# Patient Record
Sex: FEMALE | Race: WHITE | NOT HISPANIC OR LATINO | Employment: UNEMPLOYED | URBAN - METROPOLITAN AREA
[De-identification: names, ages, dates, MRNs, and addresses within clinical notes are randomized per-mention and may not be internally consistent; named-entity substitution may affect disease eponyms.]

---

## 2018-02-22 ENCOUNTER — OFFICE VISIT (OUTPATIENT)
Dept: URGENT CARE | Facility: PHYSICIAN GROUP | Age: 23
End: 2018-02-22
Payer: COMMERCIAL

## 2018-02-22 VITALS
BODY MASS INDEX: 23.39 KG/M2 | TEMPERATURE: 99.5 F | DIASTOLIC BLOOD PRESSURE: 76 MMHG | OXYGEN SATURATION: 99 % | WEIGHT: 132 LBS | HEART RATE: 76 BPM | HEIGHT: 63 IN | SYSTOLIC BLOOD PRESSURE: 122 MMHG | RESPIRATION RATE: 16 BRPM

## 2018-02-22 DIAGNOSIS — J45.31 MILD PERSISTENT ASTHMA WITH ACUTE EXACERBATION: ICD-10-CM

## 2018-02-22 PROCEDURE — 99203 OFFICE O/P NEW LOW 30 MIN: CPT | Mod: 25 | Performed by: PHYSICIAN ASSISTANT

## 2018-02-22 PROCEDURE — 94640 AIRWAY INHALATION TREATMENT: CPT | Performed by: PHYSICIAN ASSISTANT

## 2018-02-22 RX ORDER — MONTELUKAST SODIUM 10 MG/1
10 TABLET ORAL DAILY
Qty: 30 TAB | Refills: 3 | Status: SHIPPED | OUTPATIENT
Start: 2018-02-22 | End: 2021-06-09

## 2018-02-22 RX ORDER — ALBUTEROL SULFATE 2.5 MG/3ML
2.5 SOLUTION RESPIRATORY (INHALATION) ONCE
Status: COMPLETED | OUTPATIENT
Start: 2018-02-22 | End: 2018-02-22

## 2018-02-22 RX ORDER — PREDNISONE 20 MG/1
TABLET ORAL
Qty: 10 TAB | Refills: 0 | Status: ON HOLD | OUTPATIENT
Start: 2018-02-22 | End: 2019-06-03

## 2018-02-22 RX ORDER — ALBUTEROL SULFATE 2.5 MG/3ML
2.5 SOLUTION RESPIRATORY (INHALATION) EVERY 4 HOURS PRN
Qty: 30 BULLET | Refills: 0 | Status: SHIPPED | OUTPATIENT
Start: 2018-02-22 | End: 2021-06-09

## 2018-02-22 RX ADMIN — ALBUTEROL SULFATE 2.5 MG: 2.5 SOLUTION RESPIRATORY (INHALATION) at 09:35

## 2018-02-22 ASSESSMENT — ENCOUNTER SYMPTOMS
WHEEZING: 1
SINUS PAIN: 0
GASTROINTESTINAL NEGATIVE: 1
MUSCULOSKELETAL NEGATIVE: 1
CONSTITUTIONAL NEGATIVE: 1
CARDIOVASCULAR NEGATIVE: 1
COUGH: 1
SORE THROAT: 0
SHORTNESS OF BREATH: 1
CHEST TIGHTNESS: 1
DIFFICULTY BREATHING: 1
NEUROLOGICAL NEGATIVE: 1
SPUTUM PRODUCTION: 0

## 2018-02-22 NOTE — LETTER
February 22, 2018         Patient: Lissette Tong   YOB: 1995   Date of Visit: 2/22/2018           To Whom it May Concern:    Lissette Tong was seen in my clinic on 2/22/2018. She may return to work on Friday Feb. 23rd.    If you have any questions or concerns, please don't hesitate to call.        Sincerely,           Kristopher Munoz P.A.-C.  Electronically Signed

## 2018-02-22 NOTE — PROGRESS NOTES
Subjective:      Lissette Tong is a 23 y.o. female who presents with Asthma (pt states short of breath, and coughing)            Asthma   She complains of chest tightness, cough, difficulty breathing, shortness of breath and wheezing. There is no sputum production. This is a new problem. The current episode started yesterday. The problem occurs constantly. The problem has been gradually worsening. The cough is non-productive. Pertinent negatives include no ear pain or sore throat. Her symptoms are alleviated by leukotriene antagonist and beta-agonist. She reports minimal improvement on treatment. Her past medical history is significant for asthma. There is no history of bronchitis or pneumonia.   Asthma exacerbation ×4 days. Patient out of her Singulair. Her nebulizer solution is also .      PMH:  has a past medical history of Asthma.  MEDS:   Current Outpatient Prescriptions:   •  albuterol (VENTOLIN OR PROVENTIL) 108 (90 BASE) MCG/ACT AERS, Inhale 2 Puffs by mouth every four hours as needed., Disp: 1 Inhaler, Rfl: 3  •  NON SPECIFIED, BCP , Disp: , Rfl:   •  azithromycin (ZITHROMAX) 250 MG TABS, Take  by mouth every day. 2 tabs by mouth day 1, 1 tab by mouth days 2-5, Disp: 6 Each, Rfl: 0  •  albuterol (PROVENTIL) 2.5mg/3ml NEBU, 3 mL by Nebulization route every four hours as needed for Shortness of Breath., Disp: 2.5 mL, Rfl: 20  •  ALBUTEROL, by Does not apply route., Disp: , Rfl:   •  ondansetron (ZOFRAN) 4 MG TABS, Take 1 Tab by mouth every 6 hours as needed for Nausea/Vomiting., Disp: 10 Each, Rfl: 1  ALLERGIES:   Allergies   Allergen Reactions   • Tylenol Vomiting     SURGHX: No past surgical history on file.  SOCHX:  reports that she has never smoked. She does not have any smokeless tobacco history on file. She reports that she does not drink alcohol or use drugs.  FH: family history is not on file.      Review of Systems   Constitutional: Negative.    HENT: Negative for congestion, ear pain, sinus  "pain and sore throat.    Respiratory: Positive for cough, shortness of breath and wheezing. Negative for sputum production.    Cardiovascular: Negative.    Gastrointestinal: Negative.    Musculoskeletal: Negative.    Neurological: Negative.        Medications, Allergies, and current problem list reviewed today in Epic     Objective:     /76   Pulse 76   Temp 37.5 °C (99.5 °F)   Resp 16   Ht 1.6 m (5' 3\")   Wt 59.9 kg (132 lb)   SpO2 99%   BMI 23.38 kg/m²      Physical Exam   Constitutional: She is oriented to person, place, and time. She appears well-developed and well-nourished. No distress.   HENT:   Head: Normocephalic and atraumatic.   Right Ear: Tympanic membrane and external ear normal.   Left Ear: Tympanic membrane and external ear normal.   Nose: Nose normal.   Mouth/Throat: Oropharynx is clear and moist. No oropharyngeal exudate.   Eyes: Conjunctivae and EOM are normal. Pupils are equal, round, and reactive to light. Right eye exhibits no discharge. Left eye exhibits no discharge.   Neck: Normal range of motion. Neck supple.   Cardiovascular: Normal rate, regular rhythm and normal heart sounds.    Pulmonary/Chest: Effort normal. No tachypnea. No respiratory distress. She has decreased breath sounds. She has wheezes. She has no rhonchi. She has no rales.   Musculoskeletal: Normal range of motion.   Lymphadenopathy:     She has no cervical adenopathy.   Neurological: She is alert and oriented to person, place, and time.   Skin: Skin is warm and dry. She is not diaphoretic.   Psychiatric: She has a normal mood and affect. Her behavior is normal. Judgment and thought content normal.   Nursing note and vitals reviewed.              Assessment/Plan:     1. Mild persistent asthma with acute exacerbation  albuterol (PROVENTIL) 2.5mg/3ml nebulizer solution 2.5 mg    predniSONE (DELTASONE) 20 MG Tab    montelukast (SINGULAIR) 10 MG Tab    albuterol (PROVENTIL) 2.5mg/3ml Nebu Soln solution for " nebulization     PO2 adequate, lungs show decreased breath sounds with wheezing. No signs of infectious etiology.  She was given a albuterol nebulized treatment and clinic, she reports significant improvement  Prednisone  Refill of Singulair and albuterol solution  OTC meds and conservative measures as discussed  Return to clinic or go to ED if symptoms worsen or persist. Indications for ED discussed at length. Patient voices understanding. Follow-up with your primary care provider in 3-5 days. Red flags discussed.    Please note that this dictation was created using voice recognition software. I have made every reasonable attempt to correct obvious errors, but I expect that there are errors of grammar and possibly content that I did not discover before finalizing the note.

## 2018-02-27 ENCOUNTER — HOSPITAL ENCOUNTER (EMERGENCY)
Facility: MEDICAL CENTER | Age: 23
End: 2018-02-28
Attending: EMERGENCY MEDICINE
Payer: COMMERCIAL

## 2018-02-27 DIAGNOSIS — R07.89 CHEST WALL PAIN: ICD-10-CM

## 2018-02-27 LAB
ALBUMIN SERPL BCP-MCNC: 4.5 G/DL (ref 3.2–4.9)
ALBUMIN/GLOB SERPL: 1.5 G/DL
ALP SERPL-CCNC: 40 U/L (ref 30–99)
ALT SERPL-CCNC: 16 U/L (ref 2–50)
ANION GAP SERPL CALC-SCNC: 9 MMOL/L (ref 0–11.9)
APTT PPP: 26.3 SEC (ref 24.7–36)
AST SERPL-CCNC: 13 U/L (ref 12–45)
BASOPHILS # BLD AUTO: 0.1 % (ref 0–1.8)
BASOPHILS # BLD: 0.01 K/UL (ref 0–0.12)
BILIRUB SERPL-MCNC: 0.2 MG/DL (ref 0.1–1.5)
BNP SERPL-MCNC: 6 PG/ML (ref 0–100)
BUN SERPL-MCNC: 10 MG/DL (ref 8–22)
CALCIUM SERPL-MCNC: 9.2 MG/DL (ref 8.5–10.5)
CHLORIDE SERPL-SCNC: 104 MMOL/L (ref 96–112)
CO2 SERPL-SCNC: 25 MMOL/L (ref 20–33)
CREAT SERPL-MCNC: 0.65 MG/DL (ref 0.5–1.4)
EKG IMPRESSION: NORMAL
EOSINOPHIL # BLD AUTO: 0 K/UL (ref 0–0.51)
EOSINOPHIL NFR BLD: 0 % (ref 0–6.9)
ERYTHROCYTE [DISTWIDTH] IN BLOOD BY AUTOMATED COUNT: 41.8 FL (ref 35.9–50)
GLOBULIN SER CALC-MCNC: 3 G/DL (ref 1.9–3.5)
GLUCOSE SERPL-MCNC: 108 MG/DL (ref 65–99)
HCG UR QL: NEGATIVE
HCT VFR BLD AUTO: 42.8 % (ref 37–47)
HGB BLD-MCNC: 14.3 G/DL (ref 12–16)
IMM GRANULOCYTES # BLD AUTO: 0.04 K/UL (ref 0–0.11)
IMM GRANULOCYTES NFR BLD AUTO: 0.5 % (ref 0–0.9)
INR PPP: 0.98 (ref 0.87–1.13)
LIPASE SERPL-CCNC: 35 U/L (ref 11–82)
LYMPHOCYTES # BLD AUTO: 0.84 K/UL (ref 1–4.8)
LYMPHOCYTES NFR BLD: 9.6 % (ref 22–41)
MCH RBC QN AUTO: 30.4 PG (ref 27–33)
MCHC RBC AUTO-ENTMCNC: 33.4 G/DL (ref 33.6–35)
MCV RBC AUTO: 90.9 FL (ref 81.4–97.8)
MONOCYTES # BLD AUTO: 0.32 K/UL (ref 0–0.85)
MONOCYTES NFR BLD AUTO: 3.7 % (ref 0–13.4)
NEUTROPHILS # BLD AUTO: 7.55 K/UL (ref 2–7.15)
NEUTROPHILS NFR BLD: 86.1 % (ref 44–72)
NRBC # BLD AUTO: 0 K/UL
NRBC BLD-RTO: 0 /100 WBC
PLATELET # BLD AUTO: 318 K/UL (ref 164–446)
PMV BLD AUTO: 9.8 FL (ref 9–12.9)
POTASSIUM SERPL-SCNC: 4 MMOL/L (ref 3.6–5.5)
PROT SERPL-MCNC: 7.5 G/DL (ref 6–8.2)
PROTHROMBIN TIME: 12.7 SEC (ref 12–14.6)
RBC # BLD AUTO: 4.71 M/UL (ref 4.2–5.4)
SODIUM SERPL-SCNC: 138 MMOL/L (ref 135–145)
SP GR UR REFRACTOMETRY: 1.02
TROPONIN I SERPL-MCNC: <0.01 NG/ML (ref 0–0.04)
WBC # BLD AUTO: 8.8 K/UL (ref 4.8–10.8)

## 2018-02-27 PROCEDURE — 80053 COMPREHEN METABOLIC PANEL: CPT

## 2018-02-27 PROCEDURE — 85379 FIBRIN DEGRADATION QUANT: CPT

## 2018-02-27 PROCEDURE — 93005 ELECTROCARDIOGRAM TRACING: CPT

## 2018-02-27 PROCEDURE — 81025 URINE PREGNANCY TEST: CPT

## 2018-02-27 PROCEDURE — 99284 EMERGENCY DEPT VISIT MOD MDM: CPT

## 2018-02-27 PROCEDURE — 83880 ASSAY OF NATRIURETIC PEPTIDE: CPT

## 2018-02-27 PROCEDURE — 93005 ELECTROCARDIOGRAM TRACING: CPT | Performed by: EMERGENCY MEDICINE

## 2018-02-27 PROCEDURE — 85730 THROMBOPLASTIN TIME PARTIAL: CPT

## 2018-02-27 PROCEDURE — 85610 PROTHROMBIN TIME: CPT

## 2018-02-27 PROCEDURE — 85025 COMPLETE CBC W/AUTO DIFF WBC: CPT

## 2018-02-27 PROCEDURE — 83690 ASSAY OF LIPASE: CPT

## 2018-02-27 PROCEDURE — 84484 ASSAY OF TROPONIN QUANT: CPT

## 2018-02-28 ENCOUNTER — HOSPITAL ENCOUNTER (OUTPATIENT)
Dept: RADIOLOGY | Facility: MEDICAL CENTER | Age: 23
End: 2018-02-28
Attending: EMERGENCY MEDICINE

## 2018-02-28 VITALS
DIASTOLIC BLOOD PRESSURE: 91 MMHG | TEMPERATURE: 98 F | HEART RATE: 71 BPM | HEIGHT: 63 IN | RESPIRATION RATE: 17 BRPM | OXYGEN SATURATION: 99 % | SYSTOLIC BLOOD PRESSURE: 137 MMHG

## 2018-02-28 LAB — DEPRECATED D DIMER PPP IA-ACNC: <200 NG/ML(D-DU)

## 2018-02-28 PROCEDURE — 700111 HCHG RX REV CODE 636 W/ 250 OVERRIDE (IP): Performed by: EMERGENCY MEDICINE

## 2018-02-28 PROCEDURE — 96372 THER/PROPH/DIAG INJ SC/IM: CPT

## 2018-02-28 PROCEDURE — 71045 X-RAY EXAM CHEST 1 VIEW: CPT

## 2018-02-28 RX ORDER — IBUPROFEN 600 MG/1
600 TABLET ORAL EVERY 6 HOURS PRN
Qty: 30 TAB | Refills: 0 | Status: ON HOLD | OUTPATIENT
Start: 2018-02-28 | End: 2019-06-03

## 2018-02-28 RX ORDER — KETOROLAC TROMETHAMINE 30 MG/ML
15 INJECTION, SOLUTION INTRAMUSCULAR; INTRAVENOUS ONCE
Status: COMPLETED | OUTPATIENT
Start: 2018-02-28 | End: 2018-02-28

## 2018-02-28 RX ADMIN — KETOROLAC TROMETHAMINE 15 MG: 30 INJECTION, SOLUTION INTRAMUSCULAR at 00:30

## 2018-02-28 NOTE — ED NOTES
"Pt ambulatory to triage, labs drawn and sent, NAD, pt educated to notify staff of any changes. Pt reports \"I took a pregnancy test and it came back negative but I think there's a chance I could be pregnant.\" Pt given urine specimen cup.   "

## 2018-02-28 NOTE — ED NOTES
"Pt rounded on, apologized for wait times, VS obtained, pt reports her chest pain is \"not as bad as before\" but she now has a headache. Pt informed to notify staff of any changes.   "

## 2018-02-28 NOTE — ED PROVIDER NOTES
"ED Provider Note    Scribed for Danny Ruff M.D. by Severiano Mejia. 2/28/2018, 12:08 AM.    Primary care provider: JUVENTINO Perera  Means of arrival: Walk In  History obtained from: Patient  History limited by: None     CHIEF COMPLAINT  Chief Complaint   Patient presents with   • Chest Pain     HPI  Lissette Tong is a 23 y.o. Female with a history of asthma who presents to the Emergency Department with chest pain. The patient reports an onset of chest pain six days ago that has been intermittent. Her chest pain is located in her mid sternum which she describes as a \"sharp\" pain. She rates her current chest pain as 3/10 in severity. Her chest pain is slightly exacerbated by laying down and is improved when sitting up. She reports some increased shortness of breath during the day today which has resolved after using her rescue inhaler six times today. She complains of a headache and sore throat associated with her shortness of breath.   No familial cardiac history. No recent long distance travel.  The patient has a history of asthma, which she treats with Albuterol in morning and Singulair at night.   She denies any fever, nausea, vomiting, leg swelling, diarrhea, cough.     REVIEW OF SYSTEMS  Pertinent positives include chest pain, headache, sore throat. Pertinent negatives include fever, nausea, vomiting, leg swelling, diarrhea, cough. All other systems negative.    PAST MEDICAL HISTORY   has a past medical history of ASTHMA.    SURGICAL HISTORY  patient denies any surgical history    SOCIAL HISTORY  Social History   Substance Use Topics   • Smoking status: Never Smoker   • Smokeless tobacco: Never Used   • Alcohol use No      History   Drug Use No     FAMILY HISTORY  None noted.     CURRENT MEDICATIONS  Home Medications     Reviewed by Yue Szymanski R.N. (Registered Nurse) on 02/27/18 at 2349  Med List Status: Not Addressed   Medication Last Dose Status   albuterol (PROVENTIL) 2.5mg/3ml Nebu " "Soln solution for nebulization  Active   montelukast (SINGULAIR) 10 MG Tab  Active   NON SPECIFIED Taking, missed two doses Active   predniSONE (DELTASONE) 20 MG Tab  Active              ALLERGIES  Allergies   Allergen Reactions   • Tylenol Vomiting     PHYSICAL EXAM  VITAL SIGNS: /91   Pulse 86   Temp 36.7 °C (98 °F)   Resp 16   Ht 1.6 m (5' 3\")   LMP 01/28/2018 (Within Days)   SpO2 100%     Constitutional: Well developed, Well nourished, no distress.   HENT: Normocephalic, Atraumatic  Eyes: Conjunctiva normal  Cardiovascular: Normal heart rate, Normal rhythm, No murmurs, equal pulses.   Pulmonary: Normal breath sounds, No respiratory distress, No wheezing, No rales, No rhonchi.  Chest: No reproducible chest wall tenderness or deformity.   Abdomen:Soft, No tenderness, No masses, no rebound, no guarding.   Musculoskeletal: No major deformities noted, No tenderness. No calf tenderness. No lower extremity edema.   Skin: Warm, Dry, No erythema, No rash.   Neurologic: Alert & oriented x 3, Normal motor function,  No focal deficits noted.    LABS  Results for orders placed or performed during the hospital encounter of 02/27/18   Troponin   Result Value Ref Range    Troponin I <0.01 0.00 - 0.04 ng/mL   Btype Natriuretic Peptide   Result Value Ref Range    B Natriuretic Peptide 6 0 - 100 pg/mL   CBC with Differential   Result Value Ref Range    WBC 8.8 4.8 - 10.8 K/uL    RBC 4.71 4.20 - 5.40 M/uL    Hemoglobin 14.3 12.0 - 16.0 g/dL    Hematocrit 42.8 37.0 - 47.0 %    MCV 90.9 81.4 - 97.8 fL    MCH 30.4 27.0 - 33.0 pg    MCHC 33.4 (L) 33.6 - 35.0 g/dL    RDW 41.8 35.9 - 50.0 fL    Platelet Count 318 164 - 446 K/uL    MPV 9.8 9.0 - 12.9 fL    Neutrophils-Polys 86.10 (H) 44.00 - 72.00 %    Lymphocytes 9.60 (L) 22.00 - 41.00 %    Monocytes 3.70 0.00 - 13.40 %    Eosinophils 0.00 0.00 - 6.90 %    Basophils 0.10 0.00 - 1.80 %    Immature Granulocytes 0.50 0.00 - 0.90 %    Nucleated RBC 0.00 /100 WBC    Neutrophils " (Absolute) 7.55 (H) 2.00 - 7.15 K/uL    Lymphs (Absolute) 0.84 (L) 1.00 - 4.80 K/uL    Monos (Absolute) 0.32 0.00 - 0.85 K/uL    Eos (Absolute) 0.00 0.00 - 0.51 K/uL    Baso (Absolute) 0.01 0.00 - 0.12 K/uL    Immature Granulocytes (abs) 0.04 0.00 - 0.11 K/uL    NRBC (Absolute) 0.00 K/uL   Complete Metabolic Panel (CMP)   Result Value Ref Range    Sodium 138 135 - 145 mmol/L    Potassium 4.0 3.6 - 5.5 mmol/L    Chloride 104 96 - 112 mmol/L    Co2 25 20 - 33 mmol/L    Anion Gap 9.0 0.0 - 11.9    Glucose 108 (H) 65 - 99 mg/dL    Bun 10 8 - 22 mg/dL    Creatinine 0.65 0.50 - 1.40 mg/dL    Calcium 9.2 8.5 - 10.5 mg/dL    AST(SGOT) 13 12 - 45 U/L    ALT(SGPT) 16 2 - 50 U/L    Alkaline Phosphatase 40 30 - 99 U/L    Total Bilirubin 0.2 0.1 - 1.5 mg/dL    Albumin 4.5 3.2 - 4.9 g/dL    Total Protein 7.5 6.0 - 8.2 g/dL    Globulin 3.0 1.9 - 3.5 g/dL    A-G Ratio 1.5 g/dL   Prothrombin Time   Result Value Ref Range    PT 12.7 12.0 - 14.6 sec    INR 0.98 0.87 - 1.13   APTT   Result Value Ref Range    APTT 26.3 24.7 - 36.0 sec   Lipase   Result Value Ref Range    Lipase 35 11 - 82 U/L   BETA-HCG QUALITATIVE URINE   Result Value Ref Range    Beta-Hcg Urine Negative Negative   REFRACTOMETER SG   Result Value Ref Range    Specific Gravity 1.022    ESTIMATED GFR   Result Value Ref Range    GFR If African American >60 >60 mL/min/1.73 m 2    GFR If Non African American >60 >60 mL/min/1.73 m 2   D-DIMER   Result Value Ref Range    D-Dimer Screen <200 <250 ng/mL(D-DU)   EKG (NOW)   Result Value Ref Range    Report       [facility] Emergency Dept.    Test Date:  2018  Pt Name:    DUNG JACOBSON              Department: ER  MRN:        [id number]                      Room:  Gender:     Female                       Technician: [id number]  :        1995                   Requested By:ER TRIAGE PROTOCOL  Order #:    [id number]                    Reading MD:    Measurements  Intervals                                 Axis  Rate:       84                           P:          61  MT:         128                          QRS:        59  QRSD:       84                           T:          27  QT:         336  QTc:        398    Interpretive Statements  SINUS RHYTHM  PROBABLE LEFT ATRIAL ABNORMALITY  No previous ECG available for comparison        All labs reviewed by me.    EKG  12 Lead EKG interpreted by me shows a normal sinus rhythm at a rate of 84. Axis normal. No ST elevations. Biphasic T wave in lead III. No old EKG for comparison.  Final impression: Nonspecific EKG.    RADIOLOGY  DX-CHEST-LIMITED (1 VIEW)   Final Result      Hypoinflation without other evidence for acute cardiopulmonary disease.        The radiologist's interpretation of all radiological studies have been reviewed by me.    COURSE & MEDICAL DECISION MAKING  Pertinent Labs & Imaging studies reviewed. (See chart for details)    12:08 AM - Patient seen and examined at bedside. Patient will be treated with Toradol 15 mg IV.  Ordered Chest X Ray, D Dimer, BetaHCG, GFR, troponin, BNP, CBC, CMP, prothrombin time, APTT, lipase, EKG to evaluate her symptoms. The differential diagnoses include but are not limited to: asthma exacerbation, pulmonary embolism, chest wall pain, pleurisy, pneumonia.      1:10 AM Recheck: Patient re-evaluated at beside. Her pain is improved after treatment with IV Toradol. The patient was updated of her lab and radiology results. She was provided with discharge instructions and is understanding of return precautions. The patient was prescribed Motrin 600 mg for treatment of her chest wall pain as needed.   The patient is referred to a primary physician for blood pressure management, diabetic screening, and for all other preventative health concerns.      Medical Decision Making: This point, the patient's chest pain is likely chest wall pain is reproducible with palpation. Her troponin is negative. Her d-dimer is negative for pulmonary  embolism again think any further imaging is necessary. A chest x-ray is otherwise unremarkable. Given her age to EKG and negative troponin after greater than 24 hours pain do not think repeat troponin is necessary. Patient will be discharged and take care of their apparent    DISPOSITION:  Patient will be discharged home in stable condition.    FOLLOW UP:  DILEEP Perera.  [facility] [place], [place]   [place] [address]  [phone]    Schedule an appointment as soon as possible for a visit in 3 days        OUTPATIENT MEDICATIONS:  Discharge Medication List as of 2/28/2018  1:22 AM      START taking these medications    Details   ibuprofen (MOTRIN) 600 MG Tab Take 1 Tab by mouth every 6 hours as needed., Disp-30 Tab, R-0, Print Rx Paper             FINAL IMPRESSION  1. Chest wall pain          Severiano HERNANDEZ (Scribe), am scribing for, and in the presence of, Danny Ruff M.D.    Electronically signed by: Severiano Mejia (Scribe), 2/28/2018    Danny HERNANDEZ M.D. personally performed the services described in this documentation, as scribed by Severiano Mejia in my presence, and it is both accurate and complete.    The note accurately reflects work and decisions made by me.  Danny Ruff  2/28/2018  4:03 AM

## 2018-02-28 NOTE — ED NOTES
Patient was educated on discharge instructions.  Patient was informed about diagnosis, symptom management, risks, and home care instructions.  Patient verbalized understanding and signed discharge instructions. Prescriptions handed to patient. Copy of discharge instructions in chart.  Patient ambulated out with steady gait.  Patient has personal belongings.

## 2018-02-28 NOTE — ED TRIAGE NOTES
CP noted for the last few days, patient was seen by pcp and given antibiotics which she finished for an upper respiratory but she states the CP remains the same.  States she is SOB.  EKG complete.  Able to speak full sentences.  Vitals stable, tachy on the monitor.

## 2018-02-28 NOTE — DISCHARGE INSTRUCTIONS
Return if you have new or different chest pain, shortness of breath, productive cough, or pass out.   Chest Wall Pain  Chest wall pain is pain felt in or around the chest bones and muscles. It may take up to 6 weeks to get better. It may take longer if you are active. Chest wall pain can happen on its own. Other times, things like germs, injury, coughing, or exercise can cause the pain.  HOME CARE   · Avoid activities that make you tired or cause pain. Try not to use your chest, belly (abdominal), or side muscles. Do not use heavy weights.  · Put ice on the sore area.  ¨ Put ice in a plastic bag.  ¨ Place a towel between your skin and the bag.  ¨ Leave the ice on for 15-20 minutes for the first 2 days.  · Only take medicine as told by your doctor.  GET HELP RIGHT AWAY IF:   · You have more pain or are very uncomfortable.  · You have a fever.  · Your chest pain gets worse.  · You have new problems.  · You feel sick to your stomach (nauseous) or throw up (vomit).  · You start to sweat or feel lightheaded.  · You have a cough with mucus (phlegm).  · You cough up blood.  MAKE SURE YOU:   · Understand these instructions.  · Will watch your condition.  · Will get help right away if you are not doing well or get worse.     This information is not intended to replace advice given to you by your health care provider. Make sure you discuss any questions you have with your health care provider.     Document Released: 06/05/2009 Document Revised: 03/11/2013 Document Reviewed: 03/14/2016  [employer] Interactive Patient Education ©2016 [employer]

## 2018-07-09 ENCOUNTER — HOSPITAL ENCOUNTER (OUTPATIENT)
Dept: LAB | Facility: MEDICAL CENTER | Age: 23
End: 2018-07-09
Attending: NURSE PRACTITIONER
Payer: COMMERCIAL

## 2018-07-09 LAB
BASOPHILS # BLD AUTO: 1.5 % (ref 0–1.8)
BASOPHILS # BLD: 0.08 K/UL (ref 0–0.12)
EOSINOPHIL # BLD AUTO: 0.1 K/UL (ref 0–0.51)
EOSINOPHIL NFR BLD: 1.9 % (ref 0–6.9)
ERYTHROCYTE [DISTWIDTH] IN BLOOD BY AUTOMATED COUNT: 40.4 FL (ref 35.9–50)
HCT VFR BLD AUTO: 44.5 % (ref 37–47)
HGB BLD-MCNC: 14.2 G/DL (ref 12–16)
IMM GRANULOCYTES # BLD AUTO: 0.01 K/UL (ref 0–0.11)
IMM GRANULOCYTES NFR BLD AUTO: 0.2 % (ref 0–0.9)
LYMPHOCYTES # BLD AUTO: 1.91 K/UL (ref 1–4.8)
LYMPHOCYTES NFR BLD: 35.7 % (ref 22–41)
MCH RBC QN AUTO: 29 PG (ref 27–33)
MCHC RBC AUTO-ENTMCNC: 31.9 G/DL (ref 33.6–35)
MCV RBC AUTO: 91 FL (ref 81.4–97.8)
MONOCYTES # BLD AUTO: 0.43 K/UL (ref 0–0.85)
MONOCYTES NFR BLD AUTO: 8 % (ref 0–13.4)
NEUTROPHILS # BLD AUTO: 2.82 K/UL (ref 2–7.15)
NEUTROPHILS NFR BLD: 52.7 % (ref 44–72)
NRBC # BLD AUTO: 0 K/UL
NRBC BLD-RTO: 0 /100 WBC
PLATELET # BLD AUTO: 265 K/UL (ref 164–446)
PMV BLD AUTO: 10.9 FL (ref 9–12.9)
RBC # BLD AUTO: 4.89 M/UL (ref 4.2–5.4)
T4 FREE SERPL-MCNC: 0.91 NG/DL (ref 0.53–1.43)
TSH SERPL DL<=0.005 MIU/L-ACNC: 2.15 UIU/ML (ref 0.38–5.33)
WBC # BLD AUTO: 5.4 K/UL (ref 4.8–10.8)

## 2018-07-09 PROCEDURE — 84439 ASSAY OF FREE THYROXINE: CPT

## 2018-07-09 PROCEDURE — 36415 COLL VENOUS BLD VENIPUNCTURE: CPT

## 2018-07-09 PROCEDURE — 85025 COMPLETE CBC W/AUTO DIFF WBC: CPT

## 2018-07-09 PROCEDURE — 84443 ASSAY THYROID STIM HORMONE: CPT

## 2018-10-24 ENCOUNTER — OFFICE VISIT (OUTPATIENT)
Dept: URGENT CARE | Facility: PHYSICIAN GROUP | Age: 23
End: 2018-10-24
Payer: COMMERCIAL

## 2018-10-24 VITALS
RESPIRATION RATE: 16 BRPM | DIASTOLIC BLOOD PRESSURE: 74 MMHG | WEIGHT: 131 LBS | TEMPERATURE: 99.5 F | OXYGEN SATURATION: 98 % | BODY MASS INDEX: 23.21 KG/M2 | HEART RATE: 111 BPM | HEIGHT: 63 IN | SYSTOLIC BLOOD PRESSURE: 124 MMHG

## 2018-10-24 DIAGNOSIS — J06.9 ACUTE URI: ICD-10-CM

## 2018-10-24 DIAGNOSIS — Z3A.08 8 WEEKS GESTATION OF PREGNANCY: ICD-10-CM

## 2018-10-24 PROCEDURE — 99213 OFFICE O/P EST LOW 20 MIN: CPT | Performed by: FAMILY MEDICINE

## 2018-10-24 NOTE — LETTER
October 24, 2018         Patient: Lissette Tong   YOB: 1995   Date of Visit: 10/24/2018           To Whom it May Concern:    Lissette Tong was seen in my clinic on 10/24/2018. Please excuse 10/24 and 10/25/2018.      Sincerely,           Jared Ghosh M.D.  Electronically Signed

## 2018-10-24 NOTE — PROGRESS NOTES
"Subjective:      Lissette Tong is a 23 y.o. female who presents with Sinus Problem (Patient is 8 weeks pregnant, poss sinus inf, congestion, cough, runny nose, sinus pressure, headache x 3days)            3 days nasal congestion and rhinorrhea.  Intermittent sinus pressure.  Productive cough without blood in sputum.  No fever.  Past medical history sinusitis.  No sinus surgery.  Has not taking any over-the-counter medicines/concerned due to 8 weeks pregnant.  Symptoms are moderate severity.  No other aggravating or alleviating factors.        Review of Systems   Constitutional: Negative for malaise/fatigue and weight loss.   HENT: Negative for ear discharge and ear pain.    Eyes: Negative for discharge and redness.   Respiratory: Negative for shortness of breath and wheezing.    Musculoskeletal: Negative for joint pain and myalgias.   Skin: Negative for itching and rash.     .  Medications, Allergies, and current problem list reviewed today in Epic       Objective:     /74 (BP Location: Left arm, Patient Position: Sitting, BP Cuff Size: Adult)   Pulse (!) 111   Temp 37.5 °C (99.5 °F) (Temporal)   Resp 16   Ht 1.6 m (5' 3\")   Wt 59.4 kg (131 lb)   LMP 08/30/2018 (Approximate)   SpO2 98%   Breastfeeding? No   BMI 23.21 kg/m²      Physical Exam   Constitutional: She is oriented to person, place, and time. She appears well-developed and well-nourished. No distress.   HENT:   Head: Normocephalic and atraumatic.   Right Ear: External ear normal.   Left Ear: External ear normal.   Nasal congestion  Pharynx red without exudate     Eyes: Conjunctivae are normal.   Neck: Neck supple.   Cardiovascular: Normal rate, regular rhythm and normal heart sounds.    Pulmonary/Chest: Effort normal and breath sounds normal. She has no wheezes.   Lymphadenopathy:     She has no cervical adenopathy.   Neurological: She is alert and oriented to person, place, and time.   Skin: Skin is warm and dry. No rash noted.        "        Assessment/Plan:     1. Acute URI     2. 8 weeks gestation of pregnancy       Differential diagnosis, natural history, supportive care, and indications for immediate follow-up discussed at length.     OTC Tylenol and Robitussin-DM okay.  No indication for antibiotics.

## 2018-10-31 ASSESSMENT — ENCOUNTER SYMPTOMS
WHEEZING: 0
EYE REDNESS: 0
SHORTNESS OF BREATH: 0
EYE DISCHARGE: 0
MYALGIAS: 0
WEIGHT LOSS: 0

## 2018-12-17 ENCOUNTER — HOSPITAL ENCOUNTER (OUTPATIENT)
Facility: MEDICAL CENTER | Age: 23
End: 2018-12-17
Attending: FAMILY MEDICINE
Payer: COMMERCIAL

## 2018-12-17 ENCOUNTER — HOSPITAL ENCOUNTER (OUTPATIENT)
Dept: LAB | Facility: MEDICAL CENTER | Age: 23
End: 2018-12-17
Attending: STUDENT IN AN ORGANIZED HEALTH CARE EDUCATION/TRAINING PROGRAM
Payer: COMMERCIAL

## 2018-12-17 LAB — TSH SERPL DL<=0.005 MIU/L-ACNC: 1.39 UIU/ML (ref 0.38–5.33)

## 2018-12-17 PROCEDURE — 87340 HEPATITIS B SURFACE AG IA: CPT | Mod: 91

## 2018-12-17 PROCEDURE — 86803 HEPATITIS C AB TEST: CPT | Mod: 91

## 2018-12-17 PROCEDURE — 87389 HIV-1 AG W/HIV-1&-2 AB AG IA: CPT | Mod: 91

## 2018-12-17 PROCEDURE — 86762 RUBELLA ANTIBODY: CPT | Mod: 91

## 2018-12-17 PROCEDURE — 85025 COMPLETE CBC W/AUTO DIFF WBC: CPT

## 2018-12-17 PROCEDURE — 86850 RBC ANTIBODY SCREEN: CPT

## 2018-12-17 PROCEDURE — 36415 COLL VENOUS BLD VENIPUNCTURE: CPT

## 2018-12-17 PROCEDURE — 86762 RUBELLA ANTIBODY: CPT

## 2018-12-17 PROCEDURE — 86900 BLOOD TYPING SEROLOGIC ABO: CPT

## 2018-12-17 PROCEDURE — 87389 HIV-1 AG W/HIV-1&-2 AB AG IA: CPT

## 2018-12-17 PROCEDURE — 86780 TREPONEMA PALLIDUM: CPT

## 2018-12-17 PROCEDURE — 86803 HEPATITIS C AB TEST: CPT

## 2018-12-17 PROCEDURE — 86780 TREPONEMA PALLIDUM: CPT | Mod: 91

## 2018-12-17 PROCEDURE — 87086 URINE CULTURE/COLONY COUNT: CPT

## 2018-12-17 PROCEDURE — 87491 CHLMYD TRACH DNA AMP PROBE: CPT

## 2018-12-17 PROCEDURE — 87340 HEPATITIS B SURFACE AG IA: CPT

## 2018-12-17 PROCEDURE — 84443 ASSAY THYROID STIM HORMONE: CPT

## 2018-12-17 PROCEDURE — 87591 N.GONORRHOEAE DNA AMP PROB: CPT

## 2018-12-17 PROCEDURE — 86901 BLOOD TYPING SEROLOGIC RH(D): CPT

## 2018-12-17 PROCEDURE — 85025 COMPLETE CBC W/AUTO DIFF WBC: CPT | Mod: 91

## 2018-12-18 LAB
ABO GROUP BLD: NORMAL
ABO GROUP BLD: NORMAL
BASOPHILS # BLD AUTO: 0.7 % (ref 0–1.8)
BASOPHILS # BLD AUTO: NORMAL % (ref 0–1.8)
BASOPHILS # BLD: 0.07 K/UL (ref 0–0.12)
BASOPHILS # BLD: NORMAL K/UL (ref 0–0.12)
BLD GP AB SCN SERPL QL: NORMAL
BLD GP AB SCN SERPL QL: NORMAL
EOSINOPHIL # BLD AUTO: 0.1 K/UL (ref 0–0.51)
EOSINOPHIL # BLD AUTO: NORMAL K/UL (ref 0–0.51)
EOSINOPHIL NFR BLD: 1 % (ref 0–6.9)
EOSINOPHIL NFR BLD: NORMAL % (ref 0–6.9)
ERYTHROCYTE [DISTWIDTH] IN BLOOD BY AUTOMATED COUNT: 43.2 FL (ref 35.9–50)
ERYTHROCYTE [DISTWIDTH] IN BLOOD BY AUTOMATED COUNT: NORMAL FL (ref 35.9–50)
HBV SURFACE AG SER QL: NEGATIVE
HBV SURFACE AG SER QL: NORMAL
HCT VFR BLD AUTO: 40.2 % (ref 37–47)
HCT VFR BLD AUTO: NORMAL % (ref 37–47)
HCV AB SER QL: NEGATIVE
HCV AB SER QL: NORMAL
HGB BLD-MCNC: 13.1 G/DL (ref 12–16)
HGB BLD-MCNC: NORMAL G/DL (ref 12–16)
HIV 1+2 AB+HIV1 P24 AG SERPL QL IA: NON REACTIVE
HIV 1+2 AB+HIV1 P24 AG SERPL QL IA: NORMAL
IMM GRANULOCYTES # BLD AUTO: 0.05 K/UL (ref 0–0.11)
IMM GRANULOCYTES # BLD AUTO: NORMAL K/UL (ref 0–0.11)
IMM GRANULOCYTES NFR BLD AUTO: 0.5 % (ref 0–0.9)
IMM GRANULOCYTES NFR BLD AUTO: NORMAL % (ref 0–0.9)
LYMPHOCYTES # BLD AUTO: 2 K/UL (ref 1–4.8)
LYMPHOCYTES # BLD AUTO: NORMAL K/UL (ref 1–4.8)
LYMPHOCYTES NFR BLD: 19 % (ref 22–41)
LYMPHOCYTES NFR BLD: NORMAL % (ref 22–41)
MCH RBC QN AUTO: 29.9 PG (ref 27–33)
MCH RBC QN AUTO: NORMAL PG (ref 27–33)
MCHC RBC AUTO-ENTMCNC: 32.6 G/DL (ref 33.6–35)
MCHC RBC AUTO-ENTMCNC: NORMAL G/DL (ref 33.6–35)
MCV RBC AUTO: 91.8 FL (ref 81.4–97.8)
MCV RBC AUTO: NORMAL FL (ref 81.4–97.8)
MONOCYTES # BLD AUTO: 0.62 K/UL (ref 0–0.85)
MONOCYTES # BLD AUTO: NORMAL K/UL (ref 0–0.85)
MONOCYTES NFR BLD AUTO: 5.9 % (ref 0–13.4)
MONOCYTES NFR BLD AUTO: NORMAL % (ref 0–13.4)
NEUTROPHILS # BLD AUTO: 7.67 K/UL (ref 2–7.15)
NEUTROPHILS # BLD AUTO: NORMAL K/UL (ref 2–7.15)
NEUTROPHILS NFR BLD: 72.9 % (ref 44–72)
NEUTROPHILS NFR BLD: NORMAL % (ref 44–72)
NRBC # BLD AUTO: 0 K/UL
NRBC # BLD AUTO: NORMAL K/UL
NRBC BLD-RTO: 0 /100 WBC
NRBC BLD-RTO: NORMAL /100 WBC
PLATELET # BLD AUTO: 285 K/UL (ref 164–446)
PLATELET # BLD AUTO: NORMAL K/UL (ref 164–446)
PMV BLD AUTO: 10.3 FL (ref 9–12.9)
PMV BLD AUTO: NORMAL FL (ref 9–12.9)
RBC # BLD AUTO: 4.38 M/UL (ref 4.2–5.4)
RBC # BLD AUTO: NORMAL M/UL (ref 4.2–5.4)
RH BLD: NORMAL
RH BLD: NORMAL
RUBV AB SER QL: 78 IU/ML
RUBV AB SER QL: NORMAL IU/ML
TREPONEMA PALLIDUM IGG+IGM AB [PRESENCE] IN SERUM OR PLASMA BY IMMUNOASSAY: NON REACTIVE
TREPONEMA PALLIDUM IGG+IGM AB [PRESENCE] IN SERUM OR PLASMA BY IMMUNOASSAY: NORMAL
WBC # BLD AUTO: 10.5 K/UL (ref 4.8–10.8)
WBC # BLD AUTO: NORMAL K/UL (ref 4.8–10.8)

## 2018-12-19 LAB
BACTERIA UR CULT: ABNORMAL
BACTERIA UR CULT: ABNORMAL
C TRACH DNA SPEC QL NAA+PROBE: NEGATIVE
N GONORRHOEA DNA SPEC QL NAA+PROBE: NEGATIVE
SIGNIFICANT IND 70042: ABNORMAL
SITE SITE: ABNORMAL
SOURCE SOURCE: ABNORMAL
SPECIMEN SOURCE: NORMAL

## 2019-02-21 ENCOUNTER — HOSPITAL ENCOUNTER (OUTPATIENT)
Facility: MEDICAL CENTER | Age: 24
End: 2019-02-21
Admitting: STUDENT IN AN ORGANIZED HEALTH CARE EDUCATION/TRAINING PROGRAM
Payer: COMMERCIAL

## 2019-02-21 VITALS
HEART RATE: 86 BPM | RESPIRATION RATE: 16 BRPM | HEIGHT: 63 IN | WEIGHT: 146 LBS | DIASTOLIC BLOOD PRESSURE: 85 MMHG | TEMPERATURE: 99.1 F | SYSTOLIC BLOOD PRESSURE: 120 MMHG | BODY MASS INDEX: 25.87 KG/M2

## 2019-02-21 LAB
APPEARANCE UR: CLEAR
COLOR UR AUTO: YELLOW
GLUCOSE UR QL STRIP.AUTO: NEGATIVE MG/DL
KETONES UR QL STRIP.AUTO: NEGATIVE MG/DL
LEUKOCYTE ESTERASE UR QL STRIP.AUTO: ABNORMAL
NITRITE UR QL STRIP.AUTO: NEGATIVE
PH UR STRIP.AUTO: 7 [PH]
PROT UR QL STRIP: NEGATIVE MG/DL
RBC UR QL AUTO: NEGATIVE
SP GR UR: 1.01

## 2019-02-21 PROCEDURE — 81002 URINALYSIS NONAUTO W/O SCOPE: CPT

## 2019-02-21 NOTE — PROGRESS NOTES
1415 - Patient of Dr. Mendiola presents s/p fall. [place] Gestation - 24.6 weeks    Reports slipping on the ice around 0800 today. Reports she grabbed a bar/handrail as she was slipping and it slowed the fall. States she made contact with the right side of her back. Denies impact to her abdomen.   Two hours after fall patient reports a random pain in the LRQ then the URQ and now reports occasional low center abdominal cramping. Denies ROM or bleeding. Denies change to vision/edema/HA, Reports  FM. FM/TOCO use discussed and placed, denies tenderness to abdomen when palpated. Cheerful/chatty affect/mood. POC discussed. Family at BS. Patient encouraged to call RN with all questions concerns needs prn.    1510 - Report to Dr. PAPITO Hooks regarding patient arrival/complaint/status. Physician to assess patient in person. POC updated with patient.     1830 - Dr. Interiano at BS. Order received to discharge home.  Patient discharged home with specific instruction to return to L&D/Physician ie.. Bleeding/ROM/decreased FM/labor/concerns for self or baby. Out of hospital with FOB.

## 2019-02-22 NOTE — PROGRESS NOTES
"[facility] AND DELIV[facility] TRIAGE PROGRESS NOTE    PATIENT ID:  NAME:  Lissette Tong  MRN:               [id number]  YOB: 1995     24 y.o. female  at at 24w6d by LMP c/w 10wk U/S with SHIRIN 2019.     Subjective: Pt fell while walking downstairs with a possible direct blow to her right gravid abdomen.  She denies any abdominal pain, contractions, bleeding, LOF, or decreased fetal movement since presentation to L&D earlier today.    negative  For CTXS.   negative Feels pain   negative for LOF  negative for vaginal bleeding.   negative for fetal movement    ROS: Patient denies any fever chills, nausea, vomiting, headache, chest pain, shortness of breath, or dysuria or unusual swelling of hands or feet.     Objective:    Vitals:    19 1500   BP: 120/85   Pulse: 86   Resp: 16   Temp: 37.3 °C (99.1 °F)   TempSrc: Temporal   Weight: 66.2 kg (146 lb)   Height: 1.6 m (5' 3\")     Temp (24hrs), Av.3 °C (99.1 °F), Min:37.3 °C (99.1 °F), Max:37.3 °C (99.1 °F)    General: No acute distress, resting comfortably in bed.  HEENT: normocephalic, nontraumatic, PERRLA, EOMI  Abdomen: gravid, nontender  Musculoskeletal: strength 5/5 in four extremities  Neuro: non focal with no numbness, tingling or changes in sensation    Cervix: not performed  [place]: Infrequent uterine contractions  FHRM: Baseline 150s, Accels to 170s, no decels, mod variability    Assessment: 24 y.o. female  at at 24w6d who presents after fall. Patient had some tenderness in R lateral flank. Since presentation right flank pain has improved, vitals have remained stable and WNL, FHTs reviewed, Cat 1 tracing x 4 hours    Plan:   1. Discharge home with return precautions and instructions to return if symptoms return, decreased FM, vaginal bleeding, LOF, increased abdominal pain  2. Continue appointment with [facility] as scheduled next Thursday      Discussed case with Dr. Quiñonez, [facility] Attending. Case was discussed and attending agreed " with plan prior to discharge of patient.

## 2019-02-22 NOTE — PROGRESS NOTES
"[facility] LABOR AND DELIVERY TRIAGE PROGRESS NOTE    PATIENT ID:  NAME:  Lissette Tong  MRN:               [id number]  YOB: 1995     24 y.o. female  at 24w6d by LMP c/w 10wk U/S with SHIRIN 2019.     Subjective: Pt presents after fall while walking downstairs, grabbed the handrail which slowed her fall and she slid down three stairs, making contact with right side of her back and right elbow. She denies any trauma/contact with her abdomen. She denies vaginal bleeding, loss of fluid, decreased fetal movement, abdominal pain or cramping.      positive feels pain in her right low-middle back and right elbow.   negative for LOF  negative for vaginal bleeding.   positive for fetal movement    ROS: Patient denies any fever chills, nausea, vomiting, headache, chest pain, shortness of breath, or dysuria or unusual swelling of hands or feet.     Objective:    Vitals:    19 1500   BP: 120/85   Pulse: 86   Resp: 16   Temp: 37.3 °C (99.1 °F)   TempSrc: Temporal   Weight: 66.2 kg (146 lb)   Height: 1.6 m (5' 3\")     Temp (24hrs), Av.3 °C (99.1 °F), Min:37.3 °C (99.1 °F), Max:37.3 °C (99.1 °F)    General: No acute distress, resting comfortably in bed.  HEENT: normocephalic, nontraumatic, PERRLA, EOMI  Cardiovascular: Heart RRR with no murmurs, rubs or gallops. Distal Pulses 2+  Respiratory: symmetric chest expansion, lungs CTAB, with no wheezes, rales, rhonci  Abdomen: gravid, tender on R flank, otherwise non-tender   Musculoskeletal: strength 5/5 in four extremities  Neuro: non focal with no numbness, tingling or changes in sensation    Cervix:  not warranted  [place]: Uterine Contractions x 2 otherwise none   FHRM: Baseline 135, Accels to 180, no decels    Assessment: 24 y.o. female  at 24w6d who presents after fall. Patient denied trauma or contact with abdomen however on physical exam, had some tenderness in R lateral flank. Patient vitals and exam/abdominal exam otherwise normal.   FHT reassuring. "     Plan:   1. We will monitor FHT x 4 hours, after which if remains reassuring and patient's vitals/exam stable, we can discharge with strict return precautions. If patient's vitals should become unstable, can order stat CBC to evaluate for blood loss.   2. Encouraged to see MD for increased painful uterine contractions @ 3-5, vaginal bleeding, loss of fluid, or other serious symptoms.     Discussed case with Dr. Quiñonez, [facility]  Attending. Case was discussed and attending agreed with plan prior to discharge of patient.    Earlene Hooks M.D.

## 2019-02-22 NOTE — DISCHARGE INSTRUCTIONS
Pre-term Labor (<37 weeks):  Call your physician or return to the hospital if:  · You have painless regular contractions more than 4 in one hour.  · Your water breaks (remember time and color).  · You have menstrual-like cramps, a low dull backache or pressure in your pelvis or back.  · Your baby does not move enough to complete the daily kick count (10 movements in 2 hours).  · Your baby moves much less often than on the days before or you have not felt your baby move all day.  · Please review the MEDICATION LIST section of your AFTER VISIT SUMMARY document.  · Take your medication as prescribed        * * * * * * * *      Preventing  Birth   birth is when your baby is delivered between 20 weeks and 37 weeks of pregnancy. A full-term pregnancy lasts for at least 37 weeks.  birth can be dangerous for your baby because the last few weeks of pregnancy are an important time for your baby's brain and lungs to grow. Many things can cause a baby to be born early. Sometimes the cause is not known. There are certain factors that make you more likely to experience  birth, such as:  · Having a previous baby born .  · Being pregnant with twins or other multiples.  · Having had fertility treatment.  · Being overweight or underweight at the start of your pregnancy.  · Having any of the following during pregnancy:  ¨ An infection, including a urinary tract infection (UTI) or an STI (sexually transmitted infection).  ¨ High blood pressure.  ¨ Diabetes.  ¨ Vaginal bleeding.  · Being age 35 or older.  · Being age 18 or younger.  · Getting pregnant within 6 months of a previous pregnancy.  · Suffering extreme stress or physical or emotional abuse during pregnancy.  · Standing for long periods of time during pregnancy, such as working at a job that requires standing.  What are the risks?  The most serious risk of  birth is that the baby may not survive. This is more likely to happen if a baby  is born before 34 weeks. Other risks and complications of  birth may include your baby having:  · Breathing problems.  · Brain damage that affects movement and coordination (cerebral palsy).  · Feeding difficulties.  · Vision or hearing problems.  · Infections or inflammation of the digestive tract (colitis).  · Developmental delays.  · Learning disabilities.  · Higher risk for diabetes, heart disease, and high blood pressure later in life.  What can I do to lower my risk?  Medical care  The most important thing you can do to lower your risk for  birth is to get routine medical care during pregnancy (prenatal care). If you have a high risk of  birth, you may be referred to a health care provider who specializes in managing high-risk pregnancies (perinatologist). You may be given medicine to help prevent  birth.  Lifestyle changes  Certain lifestyle changes can also lower your risk of  birth:  · Wait at least 6 months after a pregnancy to become pregnant again.  · Try to plan pregnancy for when you are between 19 and 35 years old.  · Get to a healthy weight before getting pregnant. If you are overweight, work with your health care provider to safely lose weight.  · Do not use any products that contain nicotine or tobacco, such as cigarettes and e-cigarettes. If you need help quitting, ask your health care provider.  · Do not drink alcohol.  · Do not use drugs.  Where to find support:  For more support, consider:  · Talking with your health care provider.  · Talking with a therapist or substance abuse counselor, if you need help quitting.  · Working with a diet and nutrition specialist (dietitian) or a  to maintain a healthy weight.  · Joining a support group.  Where to find more information:  Learn more about preventing  birth from:  · Centers for Disease Control and Prevention: cdc.gov/reproductivehealth/maternalinfanthealth/pretermbirth.htm  ·   Dimes: marchofdimes.org/complications/premature-babies.aspx  · American Pregnancy Association: americanpregnancy.org/labor-and-birth/premature-labor  Contact a health care provider if:  · You have any of the following signs of  labor before 37 weeks:  ¨ A change or increase in vaginal discharge.  ¨ Fluid leaking from your vagina.  ¨ Pressure or cramps in your lower abdomen.  ¨ A backache that does not go away or gets worse.  ¨ Regular tightening (contractions) in your lower abdomen.  Summary  ·  birth means having your baby during weeks 20-37 of pregnancy.  ·  birth may put your baby at risk for physical and mental problems.  · Getting good prenatal care can help prevent  birth.  · You can lower your risk of  birth by making certain lifestyle changes, such as not smoking and not using alcohol.  This information is not intended to replace advice given to you by your health care provider. Make sure you discuss any questions you have with your health care provider.  Document Released: 2017 Document Revised: 2017 Document Reviewed: 2017  [employer] Interactive Patient Education © 2017 [employer]

## 2019-02-26 ENCOUNTER — APPOINTMENT (OUTPATIENT)
Dept: LAB | Facility: MEDICAL CENTER | Age: 24
End: 2019-02-26
Attending: ADVANCED PRACTICE MIDWIFE
Payer: COMMERCIAL

## 2019-02-26 PROCEDURE — 87591 N.GONORRHOEAE DNA AMP PROB: CPT

## 2019-02-26 PROCEDURE — 87491 CHLMYD TRACH DNA AMP PROBE: CPT

## 2019-02-27 LAB
C TRACH DNA SPEC QL NAA+PROBE: NEGATIVE
N GONORRHOEA DNA SPEC QL NAA+PROBE: NEGATIVE
SPECIMEN SOURCE: NORMAL

## 2019-03-20 ENCOUNTER — APPOINTMENT (OUTPATIENT)
Dept: LAB | Facility: MEDICAL CENTER | Age: 24
End: 2019-03-20
Attending: STUDENT IN AN ORGANIZED HEALTH CARE EDUCATION/TRAINING PROGRAM
Payer: COMMERCIAL

## 2019-03-20 LAB
GLUCOSE 1H P 50 G GLC PO SERPL-MCNC: 68 MG/DL (ref 70–139)
HIV 1+2 AB+HIV1 P24 AG SERPL QL IA: NON REACTIVE
TREPONEMA PALLIDUM IGG+IGM AB [PRESENCE] IN SERUM OR PLASMA BY IMMUNOASSAY: NON REACTIVE

## 2019-03-20 PROCEDURE — 82950 GLUCOSE TEST: CPT

## 2019-03-20 PROCEDURE — 86780 TREPONEMA PALLIDUM: CPT

## 2019-03-20 PROCEDURE — 36415 COLL VENOUS BLD VENIPUNCTURE: CPT

## 2019-03-20 PROCEDURE — 87389 HIV-1 AG W/HIV-1&-2 AB AG IA: CPT

## 2019-03-20 PROCEDURE — 85027 COMPLETE CBC AUTOMATED: CPT

## 2019-04-05 ENCOUNTER — HOSPITAL ENCOUNTER (OUTPATIENT)
Facility: MEDICAL CENTER | Age: 24
End: 2019-04-05
Payer: COMMERCIAL

## 2019-04-05 VITALS
HEIGHT: 63 IN | HEART RATE: 77 BPM | BODY MASS INDEX: 28.88 KG/M2 | SYSTOLIC BLOOD PRESSURE: 121 MMHG | DIASTOLIC BLOOD PRESSURE: 74 MMHG | WEIGHT: 163 LBS | TEMPERATURE: 98.5 F

## 2019-04-05 LAB
APPEARANCE UR: CLEAR
COLOR UR AUTO: YELLOW
GLUCOSE UR QL STRIP.AUTO: NEGATIVE MG/DL
KETONES UR QL STRIP.AUTO: NEGATIVE MG/DL
LEUKOCYTE ESTERASE UR QL STRIP.AUTO: NEGATIVE
NITRITE UR QL STRIP.AUTO: NEGATIVE
PH UR STRIP.AUTO: 6 [PH]
PROT UR QL STRIP: NEGATIVE MG/DL
RBC UR QL AUTO: NEGATIVE
SP GR UR: 1.02

## 2019-04-05 PROCEDURE — 81002 URINALYSIS NONAUTO W/O SCOPE: CPT

## 2019-04-05 PROCEDURE — 59025 FETAL NON-STRESS TEST: CPT

## 2019-04-05 NOTE — DISCHARGE INSTRUCTIONS
Please carefully review your entire AFTER VISIT SUMMARY document for all discharge instructions.Pre-term Labor (<37 weeks):  Call your physician or return to the hospital if:  · You have painless regular contractions more than 4 in one hour.  · Your water breaks (remember time and color).  · You have menstrual-like cramps, a low dull backache or pressure in your pelvis or back.  · Your baby does not move enough to complete the daily kick count (10 movements in 2 hours).  · Your baby moves much less often than on the days before or you have not felt your baby move all day.  · Please review the MEDICATION LIST section of your AFTER VISIT SUMMARY document.  · Take your medication as prescribed

## 2019-04-05 NOTE — PROGRESS NOTES
1600 Pt here from home with complaints of abdominal pain for the last two days. Pt reports pain on the right upp quadrent radiating down to the lower abdomen. Pt reports she is not in any pain at the moment as the pain comes and goes. Abdomen soft upon palpation and pt denies any pain during palpation.  External monitors placed and POC discussed.   1605 Dr. Castellanos at bedside. POC discussed.   1614 Dr. Castellanos phoned. Orders received to preform SVE and FFN.   1615 Pt reports having sexual intercourse in the last 24 hours.  1619 Dr. Castellanos updated and orders received to not preform FFN. Orders received to preform SVE and then to notify MD of results.   1620 Pt updated on POC  1630 Dr. Castellanos updated on pt SVE. Orders received to d/c pt.   1634 Discharge instructions given including  labor precautions, UCs, ROM, VB, abn fm/fetal kick counts, fluid intake, when to return to L&D and follow up with [facility]. Questions answered and Pt verbalized agreement with POC and discharge. Discharge paperwork signed. Pt discharged via ambulation in stable condition with Family member and personal belongings.

## 2019-04-05 NOTE — PROGRESS NOTES
"S:  Patient is a 24 year old G1 at 30w5d here for lower abdominal cramping pain for the past few days.  Patient reports she has had some bilateral lower quadrant abdominal pain she describes as \"tightness\" for 3 days.  She denies vaginal bleeding, discharge, dysuria.  She endorses good fetal movement.  She hasn't noticed a pattern to the pain and doesn't notice what has made it better or worse.  It is not very severe overall.  Her pregnancy has been uncomplicated besides an episode 2 months ago when she fell down some stairs and required 4 hour monitoring.  Last intercourse was yesterday.      O:  Vitals:    04/05/19 1600   BP: 121/74   Pulse: 77   Temp: 36.9 °C (98.5 °F)   TempSrc: Temporal   Weight: 73.9 kg (163 lb)   Height: 1.6 m (5' 3\")   GEN: NAD  CV: RRR  Chest: CTAB  Abd: Soft, nontender, gravid, normal bowel sounds  SVE: closed/thick/high    FHT: 140d, moderate variability, + accels, no decels  [place]: Quiet    A/P:  24 year old G1 at 30w5d here for abdominal pain  -Seems like round ligament pain  -Nontender on exam and patient very well appearing  -Unable to complete FFN as patient had recent intercourse.  -UA normal  -Will discharge home with return precautions    Discussed case with Sonia Trujillo CNM  "

## 2019-05-04 ENCOUNTER — APPOINTMENT (OUTPATIENT)
Dept: OTHER | Facility: IMAGING CENTER | Age: 24
End: 2019-05-04

## 2019-05-28 ENCOUNTER — HOSPITAL ENCOUNTER (OUTPATIENT)
Dept: LAB | Facility: MEDICAL CENTER | Age: 24
End: 2019-05-28
Attending: FAMILY MEDICINE
Payer: COMMERCIAL

## 2019-05-28 LAB
BASOPHILS # BLD AUTO: 0.5 % (ref 0–1.8)
BASOPHILS # BLD: 0.05 K/UL (ref 0–0.12)
EOSINOPHIL # BLD AUTO: 0.09 K/UL (ref 0–0.51)
EOSINOPHIL NFR BLD: 0.8 % (ref 0–6.9)
ERYTHROCYTE [DISTWIDTH] IN BLOOD BY AUTOMATED COUNT: 43.2 FL (ref 35.9–50)
HCT VFR BLD AUTO: 41.9 % (ref 37–47)
HGB BLD-MCNC: 13.5 G/DL (ref 12–16)
IMM GRANULOCYTES # BLD AUTO: 0.06 K/UL (ref 0–0.11)
IMM GRANULOCYTES NFR BLD AUTO: 0.6 % (ref 0–0.9)
LYMPHOCYTES # BLD AUTO: 1.77 K/UL (ref 1–4.8)
LYMPHOCYTES NFR BLD: 16.7 % (ref 22–41)
MCH RBC QN AUTO: 29 PG (ref 27–33)
MCHC RBC AUTO-ENTMCNC: 32.2 G/DL (ref 33.6–35)
MCV RBC AUTO: 89.9 FL (ref 81.4–97.8)
MONOCYTES # BLD AUTO: 1.04 K/UL (ref 0–0.85)
MONOCYTES NFR BLD AUTO: 9.8 % (ref 0–13.4)
NEUTROPHILS # BLD AUTO: 7.6 K/UL (ref 2–7.15)
NEUTROPHILS NFR BLD: 71.6 % (ref 44–72)
NRBC # BLD AUTO: 0 K/UL
NRBC BLD-RTO: 0 /100 WBC
PLATELET # BLD AUTO: 263 K/UL (ref 164–446)
PMV BLD AUTO: 11.4 FL (ref 9–12.9)
RBC # BLD AUTO: 4.66 M/UL (ref 4.2–5.4)
WBC # BLD AUTO: 10.6 K/UL (ref 4.8–10.8)

## 2019-05-28 PROCEDURE — 36415 COLL VENOUS BLD VENIPUNCTURE: CPT

## 2019-05-28 PROCEDURE — 85025 COMPLETE CBC W/AUTO DIFF WBC: CPT

## 2019-05-31 ENCOUNTER — HOSPITAL ENCOUNTER (OUTPATIENT)
Facility: MEDICAL CENTER | Age: 24
End: 2019-06-01
Admitting: STUDENT IN AN ORGANIZED HEALTH CARE EDUCATION/TRAINING PROGRAM
Payer: COMMERCIAL

## 2019-05-31 VITALS
SYSTOLIC BLOOD PRESSURE: 123 MMHG | WEIGHT: 176 LBS | HEART RATE: 78 BPM | BODY MASS INDEX: 31.18 KG/M2 | TEMPERATURE: 97.5 F | HEIGHT: 63 IN | DIASTOLIC BLOOD PRESSURE: 79 MMHG

## 2019-05-31 PROCEDURE — 59025 FETAL NON-STRESS TEST: CPT

## 2019-06-01 NOTE — PROGRESS NOTES
"[facility] LABOR AND DELIVERY TRIAGE PROGRESS NOTE    PATIENT ID:  NAME:  Lissette Tong  MRN:               [id number]  YOB: 1995    Subjective:  Lissette Tong is a 23yo female  at 39w0d based on first trimester ultrasound presenting to labor and delivery triage with uterine contractions she has timed every 4 to 5 minutes apart.  She admits that her uterine contractions started this morning around 4 AM and they have increased in frequency until she arrived to L&D triage.  She admits that since she has arrived her uterine contractions have decreased in frequency and severity.  She does note that throughout the day her contractions become more painful, as she started to feel them in her back as well.  Of note, patient had an appointment with [facility] on Monday and cervical exam showed 1 cm dilatation.  She currently denies headaches, chest pain, shortness of breath, abdominal pain, nausea, vomiting, and change in bowel or bladder function.  She denies any urinary symptoms at this time.  She admits that around 1800 this afternoon she may have lost her mucous plug, as she had noticed a mucousy-like discharge that had a pink hue to it.  She denies loss of fluid, vaginal bleeding, and she admits that she can still feel her baby move.  Patient has received good [facility] at [facility].  She continues to take her prenatal vitamin.    ROS:   For complete review of systems, please see subjective.     Objective:    Vitals:    19 2308 19 2338   BP:  123/79   Pulse:  78   Temp:  36.4 °C (97.5 °F)   TempSrc:  Temporal   Weight: 79.8 kg (176 lb)    Height: 1.6 m (5' 3\")      Temp (24hrs), Av.4 °C (97.5 °F), Min:36.4 °C (97.5 °F), Max:36.4 °C (97.5 °F)    General: No acute distress, resting comfortably in bed  HEENT: Normocephalic, nontraumatic, PERRLA, EOMI  Cardiovascular: Heart RRR with no murmurs, Distal Pulses 2+, no significant edema  Respiratory: Symmetric chest expansion, no " WOB  Abdomen: Gravid, non-tender, no guarding, no rebound  Musculoskeletal: Strength 5/5 in four extremities  Skin: Multiple tattoos throughout, no rashes or lesions  Neuro: Non focal with no numbness, tingling or changes in sensation    Cervix: 1-2 cm/~50%/Posterior  [place]: Uterine Contractions Q 6-minute  FHRM: Baseline 155-160, Accels to 180, no decels, moderate variability    Assessment:   Lissette Tong is a 23yo female  at 39w0d based on first trimester ultrasound who has been experiencing increased uterine contractions throughout the day.  On initial presentation patient was found to be 1 to 2 cm dilated, however after 1 hour she was rechecked and was found to be unchanged at the time.  Regarding the mucus discharge that she experienced around 18:00, and this was most likely her mucous plug.  Currently on monitor, patient is gentry every 8-9 minutes apart.  Contractions have significantly decreased since patient has arrived to labor and delivery triage.  Fetal heart rate monitor shows a baseline of 155 to 160 bpm with accelerations to 180 with no decelerations, moderate variability.    Plan:   -The patient will be discharged home with return precautions and instructions to return to labor and delivery triage should she start to experience contractions every 2 to 3 minutes apart, or contractions that are increasingly painful.  She is also instructed to return should she notice loss of fluid or vaginal bleeding.  -Patient was encouraged to continue to stay adequately hydrated, continue to eat a diet rich in fruits, vegetables, and lean meats, and also continue to take her prenatal vitamin.  -Encouraged [facility] w/ next appt being on Thursday the .  -Patient offered Benadryl to take in effort to help sleep tonight, however she declined this intervention      This case has been discussed with Corie Simpson CNM, [facility] attending.  Case was discussed and attending agreed with plan prior to discharge patient.  Attending had reviewed airstrip on patient.      Jean Paul Soto DO, MPH (PGY-2)

## 2019-06-01 NOTE — PROGRESS NOTES
25 y/o , EDC 6/, EGA 39.0. Pt here for UCs. Pt stating they began at 0400 this AM and worsening in last hour, pt feeling them every 5 min. Pt states +FM, denies vaginal LOF/bleeding. SVE:1-2/-2.  2320 - Dr. Soto at bedside.  Plan to watch monitor and recheck SVE in an hour. Strip reviewed by Dr. Soto and [place] Simpson, aware of baseline.  0004 - SVE unchanged. Dr. Soto updated. MD in to speak with pt. Labor precautions gone over. Pt feels safe to discharge home and understands when to return. Pt denies wanting Benadryl to help rest. Strip reviewed by Dr. Soto and [place] Simpson CN.  0018 - Discharge paperwork gone over with pt. Pt feels safe to discharge and pt understands when to return. Pt left with family.

## 2019-06-02 ENCOUNTER — HOSPITAL ENCOUNTER (INPATIENT)
Facility: MEDICAL CENTER | Age: 24
LOS: 1 days | End: 2019-06-03
Payer: COMMERCIAL

## 2019-06-02 ENCOUNTER — ANESTHESIA EVENT (OUTPATIENT)
Dept: OBGYN | Facility: MEDICAL CENTER | Age: 24
End: 2019-06-02
Payer: COMMERCIAL

## 2019-06-02 ENCOUNTER — ANESTHESIA (OUTPATIENT)
Dept: OBGYN | Facility: MEDICAL CENTER | Age: 24
End: 2019-06-02
Payer: COMMERCIAL

## 2019-06-02 PROBLEM — J45.909 ASTHMA: Status: ACTIVE | Noted: 2019-06-02

## 2019-06-02 LAB
BASOPHILS # BLD AUTO: 0.2 % (ref 0–1.8)
BASOPHILS # BLD: 0.04 K/UL (ref 0–0.12)
EOSINOPHIL # BLD AUTO: 0 K/UL (ref 0–0.51)
EOSINOPHIL NFR BLD: 0 % (ref 0–6.9)
ERYTHROCYTE [DISTWIDTH] IN BLOOD BY AUTOMATED COUNT: 43.5 FL (ref 35.9–50)
HCT VFR BLD AUTO: 40.9 % (ref 37–47)
HGB BLD-MCNC: 13.2 G/DL (ref 12–16)
HOLDING TUBE BB 8507: NORMAL
IMM GRANULOCYTES # BLD AUTO: 0.12 K/UL (ref 0–0.11)
IMM GRANULOCYTES NFR BLD AUTO: 0.7 % (ref 0–0.9)
LYMPHOCYTES # BLD AUTO: 1.26 K/UL (ref 1–4.8)
LYMPHOCYTES NFR BLD: 7.7 % (ref 22–41)
MCH RBC QN AUTO: 28.8 PG (ref 27–33)
MCHC RBC AUTO-ENTMCNC: 32.3 G/DL (ref 33.6–35)
MCV RBC AUTO: 89.1 FL (ref 81.4–97.8)
MONOCYTES # BLD AUTO: 0.47 K/UL (ref 0–0.85)
MONOCYTES NFR BLD AUTO: 2.9 % (ref 0–13.4)
NEUTROPHILS # BLD AUTO: 14.38 K/UL (ref 2–7.15)
NEUTROPHILS NFR BLD: 88.5 % (ref 44–72)
NRBC # BLD AUTO: 0 K/UL
NRBC BLD-RTO: 0 /100 WBC
PLATELET # BLD AUTO: 242 K/UL (ref 164–446)
PMV BLD AUTO: 10.7 FL (ref 9–12.9)
RBC # BLD AUTO: 4.59 M/UL (ref 4.2–5.4)
WBC # BLD AUTO: 16.3 K/UL (ref 4.8–10.8)

## 2019-06-02 PROCEDURE — 304965 HCHG RECOVERY SERVICES

## 2019-06-02 PROCEDURE — 700111 HCHG RX REV CODE 636 W/ 250 OVERRIDE (IP)

## 2019-06-02 PROCEDURE — 700111 HCHG RX REV CODE 636 W/ 250 OVERRIDE (IP): Performed by: ANESTHESIOLOGY

## 2019-06-02 PROCEDURE — 85025 COMPLETE CBC W/AUTO DIFF WBC: CPT

## 2019-06-02 PROCEDURE — 10907ZC DRAINAGE OF AMNIOTIC FLUID, THERAPEUTIC FROM PRODUCTS OF CONCEPTION, VIA NATURAL OR ARTIFICIAL OPENING: ICD-10-PCS | Performed by: STUDENT IN AN ORGANIZED HEALTH CARE EDUCATION/TRAINING PROGRAM

## 2019-06-02 PROCEDURE — 303615 HCHG EPIDURAL/SPINAL ANESTHESIA FOR LABOR

## 2019-06-02 PROCEDURE — A9270 NON-COVERED ITEM OR SERVICE: HCPCS | Performed by: FAMILY MEDICINE

## 2019-06-02 PROCEDURE — 770002 HCHG ROOM/CARE - OB PRIVATE (112)

## 2019-06-02 PROCEDURE — 700112 HCHG RX REV CODE 229: Performed by: STUDENT IN AN ORGANIZED HEALTH CARE EDUCATION/TRAINING PROGRAM

## 2019-06-02 PROCEDURE — 36415 COLL VENOUS BLD VENIPUNCTURE: CPT

## 2019-06-02 PROCEDURE — 59409 OBSTETRICAL CARE: CPT

## 2019-06-02 PROCEDURE — 0HQ9XZZ REPAIR PERINEUM SKIN, EXTERNAL APPROACH: ICD-10-PCS | Performed by: STUDENT IN AN ORGANIZED HEALTH CARE EDUCATION/TRAINING PROGRAM

## 2019-06-02 PROCEDURE — 700105 HCHG RX REV CODE 258: Performed by: FAMILY MEDICINE

## 2019-06-02 PROCEDURE — A9270 NON-COVERED ITEM OR SERVICE: HCPCS | Performed by: STUDENT IN AN ORGANIZED HEALTH CARE EDUCATION/TRAINING PROGRAM

## 2019-06-02 PROCEDURE — 700111 HCHG RX REV CODE 636 W/ 250 OVERRIDE (IP): Performed by: FAMILY MEDICINE

## 2019-06-02 PROCEDURE — 700102 HCHG RX REV CODE 250 W/ 637 OVERRIDE(OP): Performed by: FAMILY MEDICINE

## 2019-06-02 RX ORDER — ROPIVACAINE HYDROCHLORIDE 2 MG/ML
INJECTION, SOLUTION EPIDURAL; INFILTRATION; PERINEURAL CONTINUOUS
Status: DISCONTINUED | OUTPATIENT
Start: 2019-06-02 | End: 2019-06-03 | Stop reason: HOSPADM

## 2019-06-02 RX ORDER — ONDANSETRON 2 MG/ML
4 INJECTION INTRAMUSCULAR; INTRAVENOUS EVERY 6 HOURS PRN
Status: DISCONTINUED | OUTPATIENT
Start: 2019-06-02 | End: 2019-06-03 | Stop reason: HOSPADM

## 2019-06-02 RX ORDER — ONDANSETRON 4 MG/1
4 TABLET, ORALLY DISINTEGRATING ORAL EVERY 6 HOURS PRN
Status: DISCONTINUED | OUTPATIENT
Start: 2019-06-02 | End: 2019-06-03 | Stop reason: HOSPADM

## 2019-06-02 RX ORDER — OXYCODONE HYDROCHLORIDE AND ACETAMINOPHEN 5; 325 MG/1; MG/1
2 TABLET ORAL EVERY 4 HOURS PRN
Status: DISCONTINUED | OUTPATIENT
Start: 2019-06-02 | End: 2019-06-03 | Stop reason: HOSPADM

## 2019-06-02 RX ORDER — ROPIVACAINE HYDROCHLORIDE 2 MG/ML
INJECTION, SOLUTION EPIDURAL; INFILTRATION; PERINEURAL
Status: COMPLETED
Start: 2019-06-02 | End: 2019-06-02

## 2019-06-02 RX ORDER — SODIUM CHLORIDE, SODIUM LACTATE, POTASSIUM CHLORIDE, AND CALCIUM CHLORIDE .6; .31; .03; .02 G/100ML; G/100ML; G/100ML; G/100ML
1000 INJECTION, SOLUTION INTRAVENOUS
Status: DISCONTINUED | OUTPATIENT
Start: 2019-06-02 | End: 2019-06-02 | Stop reason: HOSPADM

## 2019-06-02 RX ORDER — OXYCODONE HYDROCHLORIDE AND ACETAMINOPHEN 5; 325 MG/1; MG/1
1 TABLET ORAL EVERY 4 HOURS PRN
Status: DISCONTINUED | OUTPATIENT
Start: 2019-06-02 | End: 2019-06-03 | Stop reason: HOSPADM

## 2019-06-02 RX ORDER — ONDANSETRON 2 MG/ML
INJECTION INTRAMUSCULAR; INTRAVENOUS
Status: COMPLETED
Start: 2019-06-02 | End: 2019-06-02

## 2019-06-02 RX ORDER — BUPIVACAINE HYDROCHLORIDE 2.5 MG/ML
INJECTION, SOLUTION EPIDURAL; INFILTRATION; INTRACAUDAL
Status: COMPLETED
Start: 2019-06-02 | End: 2019-06-02

## 2019-06-02 RX ORDER — MISOPROSTOL 200 UG/1
800 TABLET ORAL
Status: DISCONTINUED | OUTPATIENT
Start: 2019-06-02 | End: 2019-06-02 | Stop reason: HOSPADM

## 2019-06-02 RX ORDER — MISOPROSTOL 200 UG/1
800 TABLET ORAL
Status: DISCONTINUED | OUTPATIENT
Start: 2019-06-02 | End: 2019-06-03 | Stop reason: HOSPADM

## 2019-06-02 RX ORDER — VITAMIN A ACETATE, BETA CAROTENE, ASCORBIC ACID, CHOLECALCIFEROL, .ALPHA.-TOCOPHEROL ACETATE, DL-, THIAMINE MONONITRATE, RIBOFLAVIN, NIACINAMIDE, PYRIDOXINE HYDROCHLORIDE, FOLIC ACID, CYANOCOBALAMIN, CALCIUM CARBONATE, FERROUS FUMARATE, ZINC OXIDE, CUPRIC OXIDE 3080; 12; 120; 400; 1; 1.84; 3; 20; 22; 920; 25; 200; 27; 10; 2 [IU]/1; UG/1; MG/1; [IU]/1; MG/1; MG/1; MG/1; MG/1; MG/1; [IU]/1; MG/1; MG/1; MG/1; MG/1; MG/1
1 TABLET, FILM COATED ORAL EVERY MORNING
Status: DISCONTINUED | OUTPATIENT
Start: 2019-06-02 | End: 2019-06-03 | Stop reason: HOSPADM

## 2019-06-02 RX ORDER — DOCUSATE SODIUM 100 MG/1
100 CAPSULE, LIQUID FILLED ORAL 2 TIMES DAILY PRN
Status: DISCONTINUED | OUTPATIENT
Start: 2019-06-02 | End: 2019-06-03 | Stop reason: HOSPADM

## 2019-06-02 RX ORDER — IBUPROFEN 600 MG/1
600 TABLET ORAL EVERY 6 HOURS PRN
Status: DISCONTINUED | OUTPATIENT
Start: 2019-06-02 | End: 2019-06-03 | Stop reason: HOSPADM

## 2019-06-02 RX ORDER — SODIUM CHLORIDE, SODIUM LACTATE, POTASSIUM CHLORIDE, CALCIUM CHLORIDE 600; 310; 30; 20 MG/100ML; MG/100ML; MG/100ML; MG/100ML
INJECTION, SOLUTION INTRAVENOUS CONTINUOUS
Status: DISPENSED | OUTPATIENT
Start: 2019-06-02 | End: 2019-06-02

## 2019-06-02 RX ORDER — SODIUM CHLORIDE, SODIUM LACTATE, POTASSIUM CHLORIDE, AND CALCIUM CHLORIDE .6; .31; .03; .02 G/100ML; G/100ML; G/100ML; G/100ML
250 INJECTION, SOLUTION INTRAVENOUS PRN
Status: DISCONTINUED | OUTPATIENT
Start: 2019-06-02 | End: 2019-06-02 | Stop reason: HOSPADM

## 2019-06-02 RX ORDER — BUPIVACAINE HYDROCHLORIDE 2.5 MG/ML
INJECTION, SOLUTION EPIDURAL; INFILTRATION; INTRACAUDAL PRN
Status: DISCONTINUED | OUTPATIENT
Start: 2019-06-02 | End: 2019-06-02 | Stop reason: SURG

## 2019-06-02 RX ORDER — SODIUM CHLORIDE, SODIUM LACTATE, POTASSIUM CHLORIDE, CALCIUM CHLORIDE 600; 310; 30; 20 MG/100ML; MG/100ML; MG/100ML; MG/100ML
INJECTION, SOLUTION INTRAVENOUS PRN
Status: DISCONTINUED | OUTPATIENT
Start: 2019-06-02 | End: 2019-06-03 | Stop reason: HOSPADM

## 2019-06-02 RX ADMIN — SODIUM CHLORIDE, POTASSIUM CHLORIDE, SODIUM LACTATE AND CALCIUM CHLORIDE: 600; 310; 30; 20 INJECTION, SOLUTION INTRAVENOUS at 05:36

## 2019-06-02 RX ADMIN — IBUPROFEN 600 MG: 600 TABLET ORAL at 20:20

## 2019-06-02 RX ADMIN — SODIUM CHLORIDE, POTASSIUM CHLORIDE, SODIUM LACTATE AND CALCIUM CHLORIDE 1000 ML: 600; 310; 30; 20 INJECTION, SOLUTION INTRAVENOUS at 04:42

## 2019-06-02 RX ADMIN — FENTANYL CITRATE 100 MCG: 50 INJECTION, SOLUTION INTRAMUSCULAR; INTRAVENOUS at 05:19

## 2019-06-02 RX ADMIN — Medication 125 ML/HR: at 11:52

## 2019-06-02 RX ADMIN — ONDANSETRON 4 MG: 2 INJECTION INTRAMUSCULAR; INTRAVENOUS at 04:25

## 2019-06-02 RX ADMIN — ROPIVACAINE HYDROCHLORIDE 100 ML: 2 INJECTION, SOLUTION EPIDURAL; INFILTRATION; PERINEURAL at 05:22

## 2019-06-02 RX ADMIN — BUPIVACAINE HYDROCHLORIDE 5 ML: 2.5 INJECTION, SOLUTION EPIDURAL; INFILTRATION; INTRACAUDAL; PERINEURAL at 05:19

## 2019-06-02 RX ADMIN — Medication 2000 ML/HR: at 10:47

## 2019-06-02 RX ADMIN — ROPIVACAINE HYDROCHLORIDE 100 ML: 2 INJECTION, SOLUTION EPIDURAL; INFILTRATION at 05:22

## 2019-06-02 RX ADMIN — DOCUSATE SODIUM 100 MG: 100 CAPSULE, LIQUID FILLED ORAL at 20:20

## 2019-06-02 ASSESSMENT — LIFESTYLE VARIABLES
ALCOHOL_USE: NO
EVER_SMOKED: NEVER

## 2019-06-02 ASSESSMENT — PAIN SCALES - GENERAL: PAIN_LEVEL: 1

## 2019-06-02 ASSESSMENT — EDINBURGH POSTNATAL DEPRESSION SCALE (EPDS)
I HAVE FELT SCARED OR PANICKY FOR NO GOOD REASON: NO, NOT AT ALL
THINGS HAVE BEEN GETTING ON TOP OF ME: NO, I HAVE BEEN COPING AS WELL AS EVER
I HAVE LOOKED FORWARD WITH ENJOYMENT TO THINGS: AS MUCH AS I EVER DID
THE THOUGHT OF HARMING MYSELF HAS OCCURRED TO ME: NEVER
I HAVE BEEN SO UNHAPPY THAT I HAVE HAD DIFFICULTY SLEEPING: NOT VERY OFTEN
I HAVE BLAMED MYSELF UNNECESSARILY WHEN THINGS WENT WRONG: YES, SOME OF THE TIME
I HAVE BEEN SO UNHAPPY THAT I HAVE BEEN CRYING: ONLY OCCASIONALLY
I HAVE BEEN ABLE TO LAUGH AND SEE THE FUNNY SIDE OF THINGS: AS MUCH AS I ALWAYS COULD
I HAVE FELT SAD OR MISERABLE: YES, QUITE OFTEN
I HAVE BEEN ANXIOUS OR WORRIED FOR NO GOOD REASON: HARDLY EVER

## 2019-06-02 ASSESSMENT — PATIENT HEALTH QUESTIONNAIRE - PHQ9
1. LITTLE INTEREST OR PLEASURE IN DOING THINGS: NOT AT ALL
2. FEELING DOWN, DEPRESSED, IRRITABLE, OR HOPELESS: NOT AT ALL
SUM OF ALL RESPONSES TO PHQ9 QUESTIONS 1 AND 2: 0
SUM OF ALL RESPONSES TO PHQ9 QUESTIONS 1 AND 2: 0
2. FEELING DOWN, DEPRESSED, IRRITABLE, OR HOPELESS: NOT AT ALL
1. LITTLE INTEREST OR PLEASURE IN DOING THINGS: NOT AT ALL

## 2019-06-02 NOTE — L&D DELIVERY NOTE
"VAGINAL DELIVERY PRODEDURE NOTE    PATIENT ID:  NAME:  Lissette Tong  MRN:               [id number]  YOB: 1995    Indication: Active labor    On 2019 this 24 y.o., now 39w2d who came in active labor, received an epidural w/ excellent effect and then AROM'ed w/ moderate meconium at  0745 and then progressed to complete. On  @ 1042 patient now , GBS neg female delivered via  under epidural anesthesia a viable male infant \"Juan\"  Weight pending with APGAR scores of 8 and 9 at one and five minutes. There was no nuchal cord. Good muscle tone immediately w/ spontaneous cry and  was placed on maternal abd to be dried and stimulated by receiving RN. Delayed cord clamping for 1.5 minutes and was doubly clamped, cut by FOB and infant handed to RN in attendance.   IV pitocin was started full. An intact placenta was delivered spontaneously at 1047 with 3 vessel cord. Uterine fundus was immediately massaged to firm. Upon vaginal exam, there were two first degree perineal lacerations, one on R and one on L, both confined to vaginal mucosa and short in lengs - both were repaired using 3.0 vicryl. in the usual sterile fashion. Bilater periurethral gaping abrasions were closed using 4.0 vicryl.  Estimated blood loss was 200cc. Patient will be transferred to postpartum in stable condition and infant to  nursery.    Ilia Mcgraw D.O.    Delivery attended by Corie Simpson CNM - [facility]  attending - who was present, gowned and gloved for the second stage and delivery.  "

## 2019-06-02 NOTE — PROGRESS NOTES
Patient arrived to [facility] via wheel chair holding baby. Assessment completed. VSS.  No complaints of pain at this time. Mother and baby ID bands verified. Patient resting in bed comfortably. Patient oriented to unit and room. All questions answered.

## 2019-06-02 NOTE — ANESTHESIA PREPROCEDURE EVALUATION
Relevant Problems   (+) Asthma      Within Normal Limits   ANESTHESIA   CARDIAC   ENDO   NEURO   OB       Physical Exam    Airway   Mallampati: II  TM distance: >3 FB  Neck ROM: full       Cardiovascular - normal exam  Rhythm: regular  Rate: normal  (-) murmur     Dental - normal exam         Pulmonary - normal exam  Breath sounds clear to auscultation     Abdominal    Neurological - normal exam                 Anesthesia Plan    ASA 2       Plan - epidural   Neuraxial block will be labor analgesia              Pertinent diagnostic labs and testing reviewed    Informed Consent:    Anesthetic plan and risks discussed with patient.

## 2019-06-02 NOTE — ANESTHESIA PROCEDURE NOTES
Epidural Block  Performed by: CAROL STOUT  Authorized by: CAROL STOUT     Patient Location:  OB  Start Time:  6/2/2019 5:12 AM  End Time:  6/2/2019 5:19 AM  Reason for Block: labor analgesia    patient identified, IV checked, site marked, risks and benefits discussed, surgical consent, monitors and equipment checked, pre-op evaluation and timeout performed    Patient Position:  Sitting  Prep: ChloraPrep, patient draped and sterile technique    Monitoring:  Blood pressure, continuous pulse oximetry and heart rate  Approach:  Midline  Location:  L3-L4  Injection Technique:  SAAD saline  Skin infiltration:  Lidocaine  Strength:  1%  Dose:  3ml  Needle Type:  Tuohy  Needle Gauge:  17 G  Needle Length:  3.5 in  Loss of resistance::  4  Catheter Size:  19 G  Catheter at Skin Depth:  9  Test Dose:  Lidocaine 1.5% with epinephrine 1-to-200,000  Test Dose Result:  Negative   Easy x 1 attempt

## 2019-06-02 NOTE — ANESTHESIA QCDR
2019 [facility] Clinical Data Registry (for Quality Improvement)     Postoperative nausea/vomiting risk protocol (Adult = 18 yrs and Pediatric 3-17 yrs)- (430 and 463)  General inhalation anesthetic (NOT TIVA) with PONV risk factors: No  Provision of anti-emetic therapy with at least 2 different classes of agents: N/A  Patient DID NOT receive anti-emetic therapy and reason is documented in Medical Record: N/A    Multimodal Pain Management- (AQI59)  Patient undergoing Elective Surgery (i.e. Outpatient, or ASC, or Prescheduled Surgery prior to Hospital Admission): No  Use of Multimodal Pain Management, two or more drugs and/or interventions, NOT including systemic opioids: N/A  Exception: Documented allergy to multiple classes of analgesics: N/A    PACU assessment of acute postoperative pain prior to Anesthesia Care End- Applies to Patients Age = 18- (ABG7)  Initial PACU pain score is which of the following: < 7/10  Patient unable to report pain score: N/A    Post-anesthetic transfer of care checklist/protocol to PACU/ICU- (426 and 427)  Upon conclusion of case, patient transferred to which of the following locations: PACU/Non-ICU  Use of transfer checklist/protocol: Yes  Exclusion: Service Performed in Patient Hospital Room (and thus did not require transfer): N/A    PACU Reintubation- (AQI31)  General anesthesia requiring endotracheal intubation (ETT) along with subsequent extubation in OR or PACU: No  Required reintubation in the PACU: N/A  Extubation was a planned trial documented in the medical record prior to removal of the original airway device: N/A    Unplanned admission to ICU related to anesthesia service up through end of PACU care- (MD51)  Unplanned admission to ICU (not initially anticipated at anesthesia start time): No

## 2019-06-02 NOTE — PROGRESS NOTES
[facility] LABOR AND DELIVERY PROGRESS NOTE    PATIENT ID:  NAME:  Lissette Tong  MRN:               [id number]  YOB: 1995     24 y.o. female  at 39w2d.    Subjective: patient w/ excellent relief from epidural - just got a nap and it is time to recheck progress.    positive  For CTXS.   negative Feels pain   negative for LOF  positive for vaginal bleeding - mucous  positive for fetal movement    Objective:    Vitals:    19 0607 19 0612 19 0635 19 0654   BP: 119/63 119/71 115/61 103/68   Pulse: 74 68 75 86   Resp:    16   Temp:    37 °C (98.6 °F)   TempSrc:    Temporal   Weight:       Height:           Cervix:  8cm/100%/0 - bulging bag - AROM w/ me stained fluid  [place]: Uterine Contractions Q2 minutes.   FHRM: Baseline 145, recently with no accels, no decels, moderate variability  Pitocin: none  Pain control: epidural      Assessment: 24 y.o. female  at 39w2d - AROM and progressing well    Plan:   1. Continue current management  2. Anticipate vaginal delivery    This plan of care was discussed w/ Corie Simpson CNM, [facility] attending, who is in agreement with the plan of care.

## 2019-06-02 NOTE — PROGRESS NOTES
0700 - Report received from ILYA Valle RN. Pt resting comfortably on right side with peanut ball in place. Perry present. EFM and toco in place. POC discussed, questions answered.   0710 - Pt repositioned to left side with peanut ball.   0745 - Dr. Mcgraw at bedside. SVE 8/100/0. AROM, clear fluid.   0910 - Dr. Mcgraw at bedside. SVE C/+2. Pushing with pt  1042 -  of viable male infant. 8/9 APGARs. Bilateral labial tears, repaired.

## 2019-06-02 NOTE — H&P
"History and Physical      HPI:  Lissette Tong is a 23yo year old female  at 39w2d who presents to labor and delivery complaining of contractions that have been present for the last 2 days that have been increasing in severity.  She has been keeping track of her contractions via her application on her phone and it shows that they have been roughly 4 to 5 minutes apart.  They have become increasingly painful over the last couple of hours.  She denies loss of fluid.  She admits to mild vaginal bleeding, however she reports that this is \"not too much\".  She continues to feel the baby move.  Of note, patient was seen in the labor and delivery triage on 31 May for the same complaint and was sent home after is found that her cervix was unchanged at 1 to 2 cm.  [facility]  She has been keeping her appointments, and taking her prenatal vitamin.  Since arriving, the patient has had several episodes of emesis secondary to pain.    ROS:  Patient denies headaches, visual changes, syncopal episodes, chest pain/pressure, shortness of breath, and change in bowel or bladder function.    Past Medical History:   Diagnosis Date   • ASTHMA      History reviewed. No pertinent surgical history.  OB History    Para Term  AB Living   1             SAB TAB Ectopic Molar Multiple Live Births                    # Outcome Date GA Lbr Mitch/2nd Weight Sex Delivery Anes PTL Lv   1 Current                 Social History     Social History   • Marital status: Single     Spouse name: N/A   • Number of children: N/A   • Years of education: N/A     Occupational History   • Not on file.     Social History Main Topics   • Smoking status: Never Smoker   • Smokeless tobacco: Never Used   • Alcohol use No   • Drug use: No   • Sexual activity: Yes     Partners: Male     Other Topics Concern   • Not on file     Social History Narrative   • No " "narrative on file     Allergies: Tylenol    Current Facility-Administered Medications:   •  LR infusion, , Intravenous, Continuous, Wanda Rodas M.D., Last Rate: 125 mL/hr at 06/02/19 0442  •  fentaNYL (SUBLIMAZE) injection 50 mcg, 50 mcg, Intravenous, Q HOUR PRN, Wanda Rodas M.D.  •  fentaNYL (SUBLIMAZE) injection 100 mcg, 100 mcg, Intravenous, Q HOUR PRN, Wanda Rodas M.D.  •  miSOPROStol (CYTOTEC) tablet 800 mcg, 800 mcg, Rectal, Once PRN, Wanda Rodas M.D.  •  ROPIVACAINE HCL 2 MG/ML INJ SOLN, , , ,     Prenatal care at [facility] via the [facility] pregnancy program.  There are no active problems to display for this patient.      Objective:      /82   Pulse 81   Ht 1.6 m (5' 2.99\")   Wt 79.8 kg (176 lb)     General:   Currently in mild pain, lying down on gurney   Skin:   normal, no lesions or rashes, multiple tattoos throughout   HEENT:  EOMI, no slceral icterus   Lungs:   CTA bilateral, no wheezes or crackles   Heart:   S1, S2 normal, no murmur, click, rub or gallop, regular rate and rhythm, peripheral pulses very brisk, mild pedal edema   Abdomen:   gravid, NT   EFW:  3100g   Pelvis:  adequate with gynecoid pelvis   FHT:  140 BPM   Uterine Size: S=D   Presentations: Cephalic   Cervix:     Dilation: 4-5cm    Effacement: 100%    Station:  -2    Consistency: Soft    Position: Anterior     Lab Review  Lab:   Blood type: A     Recent Results (from the past 5880 hour(s))   CHLAMYDIA/GC PCR URINE OR SWAB    Collection Time: 12/17/18  2:56 PM   Result Value Ref Range    Source Urine     C. trachomatis by PCR Negative Negative    N. gonorrhoeae by PCR Negative Negative   CBC WITH DIFFERENTIAL    Collection Time: 12/17/18  2:56 PM   Result Value Ref Range    WBC See Note 4.8 - 10.8 K/uL    RBC RR 4.20 - 5.40 M/uL    Hemoglobin RR 12.0 - 16.0 g/dL    Hematocrit RR 37.0 - 47.0 %    MCV RR 81.4 - 97.8 fL    MCH RR 27.0 - 33.0 pg    MCHC RR 33.6 - 35.0 g/dL    RDW RR 35.9 - 50.0 fL    " Platelet Count  - 446 K/uL    MPV RR 9.0 - 12.9 fL    Neutrophils-Polys RR 44.00 - 72.00 %    Lymphocytes RR 22.00 - 41.00 %    Monocytes RR 0.00 - 13.40 %    Eosinophils RR 0.00 - 6.90 %    Basophils RR 0.00 - 1.80 %    Immature Granulocytes RR 0.00 - 0.90 %    Nucleated RBC RR /100 WBC    Neutrophils (Absolute) RR 2.00 - 7.15 K/uL    Lymphs (Absolute) RR 1.00 - 4.80 K/uL    Monos (Absolute) RR 0.00 - 0.85 K/uL    Eos (Absolute) RR 0.00 - 0.51 K/uL    Baso (Absolute) RR 0.00 - 0.12 K/uL    Immature Granulocytes (abs) RR 0.00 - 0.11 K/uL    NRBC (Absolute) RR K/uL   ABO AND RH DETERMINATION    Collection Time: 12/17/18  2:56 PM   Result Value Ref Range    ABO Grouping Only      Rh Grouping Only     RUBELLA ABS IGG    Collection Time: 12/17/18  2:56 PM   Result Value Ref Range    Rubella IgG Antibody RR IU/mL   HEP B SURFACE ANTIGEN    Collection Time: 12/17/18  2:56 PM   Result Value Ref Range    Hepatitis B Surface Antigen RR Negative   T.PALLIDUM AB EIA    Collection Time: 12/17/18  2:56 PM   Result Value Ref Range    Syphilis, Treponemal Qual RR Non Reactive   ANTIBODY SCREEN    Collection Time: 12/17/18  2:56 PM   Result Value Ref Range    Antibody Screen Scrn PNDNG    TSH    Collection Time: 12/17/18  2:56 PM   Result Value Ref Range    TSH 1.390 0.380 - 5.330 uIU/mL   HEP C VIRUS ANTIBODY    Collection Time: 12/17/18  2:56 PM   Result Value Ref Range    Hepatitis C Antibody RR Negative   HIV AG/AB COMBO ASSAY SCREENING    Collection Time: 12/17/18  2:56 PM   Result Value Ref Range    HIV Ag/Ab Combo Assay RR Non Reactive   URINE CULTURE(NEW)    Collection Time: 12/17/18  2:56 PM   Result Value Ref Range    Significant Indicator POS (POS)     Source UR     Site Clean Catch     Urine Culture Mixed skin dilia ,000 cfu/mL (A)     Urine Culture Candida albicans  10-50,000 cfu/mL   (A)    T.PALLIDUM AB EIA    Collection Time: 12/17/18  2:56 PM   Result Value Ref Range    Syphilis, Treponemal Qual Non  Reactive Non Reactive   HEP B SURFACE ANTIGEN    Collection Time: 12/17/18  2:56 PM   Result Value Ref Range    Hepatitis B Surface Antigen Negative Negative   RUBELLA ABS IGG    Collection Time: 12/17/18  2:56 PM   Result Value Ref Range    Rubella IgG Antibody 78.00 IU/mL   HEP C VIRUS ANTIBODY    Collection Time: 12/17/18  2:56 PM   Result Value Ref Range    Hepatitis C Antibody Negative Negative   HIV AG/AB COMBO ASSAY SCREENING    Collection Time: 12/17/18  2:56 PM   Result Value Ref Range    HIV Ag/Ab Combo Assay Non Reactive Non Reactive   CBC WITH DIFFERENTIAL    Collection Time: 12/17/18  2:56 PM   Result Value Ref Range    WBC 10.5 4.8 - 10.8 K/uL    RBC 4.38 4.20 - 5.40 M/uL    Hemoglobin 13.1 12.0 - 16.0 g/dL    Hematocrit 40.2 37.0 - 47.0 %    MCV 91.8 81.4 - 97.8 fL    MCH 29.9 27.0 - 33.0 pg    MCHC 32.6 (L) 33.6 - 35.0 g/dL    RDW 43.2 35.9 - 50.0 fL    Platelet Count 285 164 - 446 K/uL    MPV 10.3 9.0 - 12.9 fL    Neutrophils-Polys 72.90 (H) 44.00 - 72.00 %    Lymphocytes 19.00 (L) 22.00 - 41.00 %    Monocytes 5.90 0.00 - 13.40 %    Eosinophils 1.00 0.00 - 6.90 %    Basophils 0.70 0.00 - 1.80 %    Immature Granulocytes 0.50 0.00 - 0.90 %    Nucleated RBC 0.00 /100 WBC    Neutrophils (Absolute) 7.67 (H) 2.00 - 7.15 K/uL    Lymphs (Absolute) 2.00 1.00 - 4.80 K/uL    Monos (Absolute) 0.62 0.00 - 0.85 K/uL    Eos (Absolute) 0.10 0.00 - 0.51 K/uL    Baso (Absolute) 0.07 0.00 - 0.12 K/uL    Immature Granulocytes (abs) 0.05 0.00 - 0.11 K/uL    NRBC (Absolute) 0.00 K/uL   ABO AND RH DETERMINATION    Collection Time: 12/17/18  2:56 PM   Result Value Ref Range    ABO Grouping Only A     Rh Grouping Only POS    ANTIBODY SCREEN    Collection Time: 12/17/18  2:56 PM   Result Value Ref Range    Antibody Screen Scrn NEG    POC UA    Collection Time: 02/21/19  7:26 PM   Result Value Ref Range    POC Color Yellow     POC Appearance Clear     POC Glucose Negative Negative mg/dL    POC Ketones Negative Negative mg/dL     POC Specific Gravity 1.015 1.005 - 1.030    POC Blood Negative Negative    POC Urine PH 7.0 5.0 - 8.0    POC Protein Negative Negative mg/dL    POC Nitrites Negative Negative    POC Leukocyte Esterase Moderate (A) Negative   CHLAMYDIA/GC PCR URINE OR SWAB    Collection Time: 02/26/19  8:00 AM   Result Value Ref Range    C. trachomatis by PCR Negative Negative    N. gonorrhoeae by PCR Negative Negative    Source Urine    URINE CULTURE(NEW)    Collection Time: 02/26/19  9:35 AM   Result Value Ref Range    Significant Indicator POS (POS)     Source UR     Site Clean Catch     Urine Culture - (A)     Urine Culture Lactobacillus species  >100,000 cfu/mL   (A)    T.PALLIDUM AB EIA    Collection Time: 03/20/19 10:06 AM   Result Value Ref Range    Syphilis, Treponemal Qual Non Reactive Non Reactive   HIV AG/AB COMBO ASSAY SCREENING    Collection Time: 03/20/19 10:06 AM   Result Value Ref Range    HIV Ag/Ab Combo Assay Non Reactive Non Reactive   GLUCOSE 1HR GESTATIONAL    Collection Time: 03/20/19 10:06 AM   Result Value Ref Range    Glucose, Post Dose 68 (L) 70 - 139 mg/dL   POC UA    Collection Time: 04/05/19  4:06 PM   Result Value Ref Range    POC Color Yellow     POC Appearance Clear     POC Glucose Negative Negative mg/dL    POC Ketones Negative Negative mg/dL    POC Specific Gravity 1.025 1.005 - 1.030    POC Blood Negative Negative    POC Urine PH 6.0 5.0 - 8.0    POC Protein Negative Negative mg/dL    POC Nitrites Negative Negative    POC Leukocyte Esterase Negative Negative   CBC WITH DIFFERENTIAL    Collection Time: 05/28/19  3:30 PM   Result Value Ref Range    WBC 10.6 4.8 - 10.8 K/uL    RBC 4.66 4.20 - 5.40 M/uL    Hemoglobin 13.5 12.0 - 16.0 g/dL    Hematocrit 41.9 37.0 - 47.0 %    MCV 89.9 81.4 - 97.8 fL    MCH 29.0 27.0 - 33.0 pg    MCHC 32.2 (L) 33.6 - 35.0 g/dL    RDW 43.2 35.9 - 50.0 fL    Platelet Count 263 164 - 446 K/uL    MPV 11.4 9.0 - 12.9 fL    Neutrophils-Polys 71.60 44.00 - 72.00 %     Lymphocytes 16.70 (L) 22.00 - 41.00 %    Monocytes 9.80 0.00 - 13.40 %    Eosinophils 0.80 0.00 - 6.90 %    Basophils 0.50 0.00 - 1.80 %    Immature Granulocytes 0.60 0.00 - 0.90 %    Nucleated RBC 0.00 /100 WBC    Neutrophils (Absolute) 7.60 (H) 2.00 - 7.15 K/uL    Lymphs (Absolute) 1.77 1.00 - 4.80 K/uL    Monos (Absolute) 1.04 (H) 0.00 - 0.85 K/uL    Eos (Absolute) 0.09 0.00 - 0.51 K/uL    Baso (Absolute) 0.05 0.00 - 0.12 K/uL    Immature Granulocytes (abs) 0.06 0.00 - 0.11 K/uL    NRBC (Absolute) 0.00 K/uL   Hold Blood Bank Specimen (Not Tested)    Collection Time: 19  4:15 AM   Result Value Ref Range    Holding Tube - Bb DONE    CBC WITH DIFFERENTIAL    Collection Time: 19  4:15 AM   Result Value Ref Range    WBC 16.3 (H) 4.8 - 10.8 K/uL    RBC 4.59 4.20 - 5.40 M/uL    Hemoglobin 13.2 12.0 - 16.0 g/dL    Hematocrit 40.9 37.0 - 47.0 %    MCV 89.1 81.4 - 97.8 fL    MCH 28.8 27.0 - 33.0 pg    MCHC 32.3 (L) 33.6 - 35.0 g/dL    RDW 43.5 35.9 - 50.0 fL    Platelet Count 242 164 - 446 K/uL    MPV 10.7 9.0 - 12.9 fL    Neutrophils-Polys 88.50 (H) 44.00 - 72.00 %    Lymphocytes 7.70 (L) 22.00 - 41.00 %    Monocytes 2.90 0.00 - 13.40 %    Eosinophils 0.00 0.00 - 6.90 %    Basophils 0.20 0.00 - 1.80 %    Immature Granulocytes 0.70 0.00 - 0.90 %    Nucleated RBC 0.00 /100 WBC    Neutrophils (Absolute) 14.38 (H) 2.00 - 7.15 K/uL    Lymphs (Absolute) 1.26 1.00 - 4.80 K/uL    Monos (Absolute) 0.47 0.00 - 0.85 K/uL    Eos (Absolute) 0.00 0.00 - 0.51 K/uL    Baso (Absolute) 0.04 0.00 - 0.12 K/uL    Immature Granulocytes (abs) 0.12 (H) 0.00 - 0.11 K/uL    NRBC (Absolute) 0.00 K/uL        Assessment:   Lissette Tong is a 24-year-old female  1 para 0 at 39w2d based on first trimester ultrasound admitted to labor and delivery for active labor.  Currently in mild to moderate pain secondary to contractions, desiring epidural.  Patient is a centering pregnancy patient up at [facility] medicine.  Moderately  elevated blood pressures upon presentation to labor and delivery at 130s 140s over 80s to 90s. Blood type A.     Plan:     Admit to labor and delivery  Prenatal labs in order  Desires epidural for pain control  GBS negative  Cont to monitor sxs of HTN and order PIH labs if persistent  Anticipate       History and physical discussed with Corie Simpson CNM, [employer] attending who is in agreement.     Jean Paul Soto DO, MPH (PGY-2)

## 2019-06-02 NOTE — ANESTHESIA TIME REPORT
Anesthesia Start and Stop Event Times     Date Time Event    6/2/2019 0509 Anesthesia Start     1042 Anesthesia Stop        Responsible Staff  06/02/19    Name Role Begin End    Ever Martin M.D. Anesth 0509 0700    Deb Mendoza M.D. Anesth 0700 1042        Preop Diagnosis (Free Text):  Pre-op Diagnosis             Preop Diagnosis (Codes):    Post op Diagnosis  Pregnancy      Premium Reason  E. Weekend    Comments:

## 2019-06-02 NOTE — PROGRESS NOTES
0415-Report from MAITE Barcenas RN. Pt desires epidural. IV started, labs drawn  0513-Dr. Martin at bedside, epidural placed, pt tolerated well  0545-Perry placed, pt tolerated well, SVE unchanged  0700-Report given to SHEREEN Lvoe RN

## 2019-06-02 NOTE — CARE PLAN
Problem: Pain  Goal: Alleviation of Pain or a reduction in pain to the patient's comfort goal  Outcome: PROGRESSING AS EXPECTED  Pain POC discussed, pt desires epidural    Problem: Risk for Infection, Impaired Wound Healing  Goal: Remain free from signs and symptoms of infection  Outcome: PROGRESSING AS EXPECTED  Pt afebrile, no s/s of infection

## 2019-06-03 VITALS
WEIGHT: 176 LBS | BODY MASS INDEX: 31.18 KG/M2 | RESPIRATION RATE: 18 BRPM | SYSTOLIC BLOOD PRESSURE: 110 MMHG | TEMPERATURE: 97.2 F | DIASTOLIC BLOOD PRESSURE: 67 MMHG | HEIGHT: 63 IN | OXYGEN SATURATION: 96 % | HEART RATE: 63 BPM

## 2019-06-03 LAB
ERYTHROCYTE [DISTWIDTH] IN BLOOD BY AUTOMATED COUNT: 44.6 FL (ref 35.9–50)
HCT VFR BLD AUTO: 36.5 % (ref 37–47)
HGB BLD-MCNC: 11.6 G/DL (ref 12–16)
MCH RBC QN AUTO: 28.8 PG (ref 27–33)
MCHC RBC AUTO-ENTMCNC: 31.8 G/DL (ref 33.6–35)
MCV RBC AUTO: 90.6 FL (ref 81.4–97.8)
PLATELET # BLD AUTO: 182 K/UL (ref 164–446)
PMV BLD AUTO: 10.6 FL (ref 9–12.9)
RBC # BLD AUTO: 4.03 M/UL (ref 4.2–5.4)
WBC # BLD AUTO: 14.8 K/UL (ref 4.8–10.8)

## 2019-06-03 PROCEDURE — 85027 COMPLETE CBC AUTOMATED: CPT

## 2019-06-03 PROCEDURE — 90471 IMMUNIZATION ADMIN: CPT

## 2019-06-03 PROCEDURE — 3E0234Z INTRODUCTION OF SERUM, TOXOID AND VACCINE INTO MUSCLE, PERCUTANEOUS APPROACH: ICD-10-PCS | Performed by: STUDENT IN AN ORGANIZED HEALTH CARE EDUCATION/TRAINING PROGRAM

## 2019-06-03 PROCEDURE — 700102 HCHG RX REV CODE 250 W/ 637 OVERRIDE(OP): Performed by: STUDENT IN AN ORGANIZED HEALTH CARE EDUCATION/TRAINING PROGRAM

## 2019-06-03 PROCEDURE — 36415 COLL VENOUS BLD VENIPUNCTURE: CPT

## 2019-06-03 PROCEDURE — 700111 HCHG RX REV CODE 636 W/ 250 OVERRIDE (IP): Performed by: STUDENT IN AN ORGANIZED HEALTH CARE EDUCATION/TRAINING PROGRAM

## 2019-06-03 PROCEDURE — 700102 HCHG RX REV CODE 250 W/ 637 OVERRIDE(OP): Performed by: FAMILY MEDICINE

## 2019-06-03 PROCEDURE — A9270 NON-COVERED ITEM OR SERVICE: HCPCS | Performed by: STUDENT IN AN ORGANIZED HEALTH CARE EDUCATION/TRAINING PROGRAM

## 2019-06-03 PROCEDURE — A9270 NON-COVERED ITEM OR SERVICE: HCPCS | Performed by: FAMILY MEDICINE

## 2019-06-03 PROCEDURE — 90732 PPSV23 VACC 2 YRS+ SUBQ/IM: CPT | Performed by: STUDENT IN AN ORGANIZED HEALTH CARE EDUCATION/TRAINING PROGRAM

## 2019-06-03 RX ORDER — IBUPROFEN 600 MG/1
600 TABLET ORAL EVERY 6 HOURS PRN
Qty: 30 TAB | Refills: 0 | Status: SHIPPED | OUTPATIENT
Start: 2019-06-03 | End: 2021-06-09

## 2019-06-03 RX ADMIN — IBUPROFEN 600 MG: 600 TABLET ORAL at 07:55

## 2019-06-03 RX ADMIN — IBUPROFEN 600 MG: 600 TABLET ORAL at 13:45

## 2019-06-03 RX ADMIN — PNEUMOCOCCAL VACCINE POLYVALENT 25 MCG
25; 25; 25; 25; 25; 25; 25; 25; 25; 25; 25; 25; 25; 25; 25; 25; 25; 25; 25; 25; 25; 25; 25 INJECTION, SOLUTION INTRAMUSCULAR; SUBCUTANEOUS at 00:12

## 2019-06-03 RX ADMIN — Medication 1 TABLET: at 06:31

## 2019-06-03 NOTE — PROGRESS NOTES
Discharged home with baby. Ambulated to car without difficulty. . Instructions given on infant care and self care

## 2019-06-03 NOTE — LACTATION NOTE
This note was copied from a baby's chart.  Met with parents before discharge. Baby was 24 hrs old at 1042 this morning and has been somewhat sleepy but is becoming more alert and demanding for feeds. He is term gestation.  Worked with Mom to get her positioned for comfort and to get baby into a deep latch. She had only done football hold so helped her get used to cross-cradle, father shown how to help and check for mother's comfort before and during feeding. Worked on deep latch, discussed the importance of deep latch on milk transfer and nipple comfort and increasing Mom's milk supply. Baby latches well when he opens his mouth wide, then showed Mom how to move baby down slightly to get baby's chin into her breast and straighter onto the nipple/areola. She denies any discomfort with latch. Baby seen swallowing especially as Mom starts feeling very sleepy. Discussed oxytocin release and what it does.    Mom/baby will be going home today. Gave them the New Beginnings booklet, encouraged them to keep track of I&O, gave them written OP and internet resources. Encouraged them to watch the EVRYTHNG video on latch.     Plan is to feed on demand and at least every 3 hrs right now. Encouraged feeds at both breasts for at least 15 min at this point in time

## 2019-06-03 NOTE — DISCHARGE INSTRUCTIONS
POSTPARTUM DISCHARGE INSTRUCTIONS FOR MOM    YOB: 1995   Age: 24 y.o.               Admit Date: 6/2/2019     Discharge Date: 6/3/2019  Attending Doctor:  Melanie Tomas                  Allergies:  Tylenol    Discharged to home by car. Discharged via wheelchair, hospital escort: Yes.  Special equipment needed: Not Applicable  Belongings with: Personal  Be sure to schedule a follow-up appointment with your primary care doctor or any specialists as instructed.     Discharge Plan:   Diet Plan: Discussed  Activity Level: Discussed  Confirmed Follow up Appointment: Patient to Call and Schedule Appointment  Confirmed Symptoms Management: Discussed  Medication Reconciliation Updated: Yes  Pneumococcal Vaccine Administered/Refused: Given (See MAR)  Influenza Vaccine Indication: Not indicated: Previously immunized this influenza season and > 8 years of age    REASONS TO CALL YOUR OBSTETRICIAN:  1.   Persistent fever or shaking chills (Temperature higher than 100.4)  2.   Heavy bleeding (soaking more than 1 pad per hour); Passing clots  3.   Foul odor from vagina  4.   Mastitis (Breast infection; breast pain, chills, fever, redness)  5.   Urinary pain, burning or frequency  6.   Episiotomy infection  7.   Severe depression longer than 24 hours    HAND WASHING  · Prior to handling the baby.  · Before breastfeeding or bottle feeding baby.  · After using the bathroom or changing the baby's diaper.    VAGINAL CARE  · Nothing inside vagina for 6 weeks: no sexual intercourse, tampons or douching.  · Bleeding may continue for 2-4 weeks.  Amount may vary.    · Call your physician for heavy bleeding which means soaking more than 1 pad per hour    BIRTH CONTROL  · It is possible to become pregnant at any time after delivery and while breastfeeding.  · Plan to discuss a method of birth control with your physician at your follow up visit. visit.    DIET AND ELIMINATION  · Eating more fiber (bran cereal, fruits, and vegetables) and  "drinking plenty of fluids will help to avoid constipation.  · Urinary frequency after childbirth is normal.    POSTPARTUM BLUES  During the first few days after birth, you may experience a sense of the \"blues\" which may include impatience, irritability or even crying.  These feeling come and go quickly.  However, as many as 1 in 10 women experience emotional symptoms known as postpartum depression.    Postpartum depression:  May start as early as the second or third day after delivery or take several weeks or months to develop.  Symptoms of \"blues\" are present, but are more intense:  Crying spells; loss of appetite; feelings of hopelessness or loss of control; fear of touching the baby; over concern or no concern at all about the baby; little or no concern about your own appearance/caring for yourself; and/or inability to sleep or excessive sleeping.  Contact your physician if you are experiencing any of these symptoms.    Crisis Hotline:  · [place] Crisis Hotline:  5-422-JNLRLAL  Or 1-583.699.8449  · [place] Crisis Hotline:  1-998.315.6523  Or 389-860-7003    PREVENTING SHAKEN BABY:  If you are angry or stressed, PUT THE BABY IN THE CRIB, step away, take some deep breaths, and wait until you are calm to care for the baby.  DO NOT SHAKE THE BABY.  You are not alone, call a supporter for help.    · Crisis Call Center 24/7 crisis line 058-402-2893 or 1-978.442.5466  · You can also text them, text \"ANSWER\" to 071148    QUIT SMOKING/TOBACCO USE:  I understand the use of any tobacco products increases my chance of suffering from future heart disease and could cause other illnesses which may shorten my life. Quitting the use of tobacco products is the single most important thing I can do to improve my health. For further information on smoking / tobacco cessation call a Toll Free Quit Line at 1-849.933.3358 (*[facility]) or 1-252.142.6081 (American Lung Association) or you can access the web based program " at www.lungusa.org.    · [place] Tobacco Users Help Line:  (389) 941-4600       Toll Free: 1-930.970.7964  · Quit Tobacco Program [facility] (132)852-2739    DEPRESSION / SUICIDE RISK:  As you are discharged from this [facility], it is important to learn how to keep safe from harming yourself.    Recognize the warning signs:  · Abrupt changes in personality, positive or negative- including increase in energy   · Giving away possessions  · Change in eating patterns- significant weight changes-  positive or negative  · Change in sleeping patterns- unable to sleep or sleeping all the time   · Unwillingness or inability to communicate  · Depression  · Unusual sadness, discouragement and loneliness  · Talk of wanting to die  · Neglect of personal appearance   · Rebelliousness- reckless behavior  · Withdrawal from people/activities they love  · Confusion- inability to concentrate     If you or a loved one observes any of these behaviors or has concerns about self-harm, here's what you can do:  · Talk about it- your feelings and reasons for harming yourself  · Remove any means that you might use to hurt yourself (examples: pills, rope, extension cords, firearm)  · Get professional help from the community (Mental Health, Substance Abuse, psychological counseling)  · Do not be alone:Call your Safe Contact- someone whom you trust who will be there for you.  · Call your local CRISIS HOTLINE 100-7596 or 889-401-1900  · Call your local Children's Mobile Crisis Response Team [place] (041) 630-3831 or www."[employer]  · Call the toll free National Suicide Prevention Hotlines   · National Suicide Prevention Lifeline 442-401-DBJP (3814)  · National Hope Line Network 800-SUICIDE (933-8588)    DISCHARGE SURVEY:  Thank you for choosing [facility].  We hope we provided you with very good care.  You may be receiving a survey in the mail.  Please fill it out.  Your opinion is valuable to  us.    ADDITIONAL EDUCATIONAL MATERIALS GIVEN TO PATIENT:        My signature on this form indicates that:  1.  I have reviewed and understand the above information  2.  My questions regarding this information have been answered to my satisfaction.  3.  I have formulated a plan with my discharge nurse to obtain my prescribed medication for home.

## 2019-06-03 NOTE — PROGRESS NOTES
Discharge instruction for mom and baby discussed. Emphasized the importance of  screening follow-up test. Questions and concerns have been answered. ID band matches with MOB.

## 2019-06-03 NOTE — CARE PLAN
Problem: Communication  Goal: The ability to communicate needs accurately and effectively will improve  Patient able to communicated needs and wants effectively

## 2019-06-03 NOTE — PROGRESS NOTES
0000- Rounded on patient and informed her that Sakina RN will be assuming care. No needs at this time.    0045- Report given at this time to Sakina RN,who is assuming care of patient.

## 2019-06-03 NOTE — DISCHARGE SUMMARY
Discharge Summary:      Lissette Tong      Admit Date:   2019  Discharge Date:  6/3/2019     Admitting diagnosis:  Pregnancy  Labor and delivery, indication for care  Discharge Diagnosis: Status post vaginal, spontaneous.  Pregnancy Complications: none  Tubal Ligation:  no    PP day 1     Subjective:  Patient doing very well - she states that she has needed minimal pain medication and would like to stay through this morning and to go tomorrow    Abdominal pain appropriate   Ambulating   yes  Tolerating PO  yes  Flatus    yes  Bleeding   Yes - heavier than a period  Voiding   yes   Dizziness   no  Breast feeding  yes  Breast tenderness  no          History:  Past Medical History:   Diagnosis Date   • ASTHMA      OB History    Para Term  AB Living   1             SAB TAB Ectopic Molar Multiple Live Births                    # Outcome Date GA Lbr Mitch/2nd Weight Sex Delivery Anes PTL Lv   1 Current                    Tylenol  Patient Active Problem List    Diagnosis Date Noted   • Asthma 2019        Hospital Course:   24 y.o. , now para 1, was admitted with the above mentioned diagnosis, underwent Active Labor, vaginal, spontaneous. Patient postpartum course was unremarkable, with progressive advancement in diet , ambulation and toleration of oral analgesia. Patient without complaints today and desires discharge.      Vitals:    19 1420 19 1800 19 2200 19 0600   BP: 118/71 121/88 115/76 110/67   Pulse: 89 79 88 63   Resp: 16 16 18 18   Temp: 36.6 °C (97.9 °F) 36.6 °C (97.8 °F) 36.7 °C (98 °F) 36.2 °C (97.2 °F)   TempSrc: Temporal Temporal Temporal Oral   SpO2: 95% 98% 96% 96%   Weight:       Height:           Current Facility-Administered Medications   Medication Dose   • ondansetron (ZOFRAN ODT) dispertab 4 mg  4 mg    Or   • ondansetron (ZOFRAN) syringe/vial injection 4 mg  4 mg   • oxytocin (PITOCIN) infusion (for postpartum)   mL/hr   • ibuprofen (MOTRIN)  tablet 600 mg  600 mg   • oxyCODONE-acetaminophen (PERCOCET) 5-325 MG per tablet 2 Tab  2 Tab   • oxyCODONE-acetaminophen (PERCOCET) 5-325 MG per tablet 1 Tab  1 Tab   • ropivacaine (NAROPIN) injection     • LR infusion     • PRN oxytocin (PITOCIN) (20 Units/1000 mL) PRN for excessive uterine bleeding - See Admin Instr  125-999 mL/hr   • miSOPROStol (CYTOTEC) tablet 800 mcg  800 mcg   • docusate sodium (COLACE) capsule 100 mg  100 mg   • prenatal plus vitamin (STUARTNATAL 1+1) 27-1 MG tablet 1 Tab  1 Tab       Exam:  General: adult female, well appearing  HEENT: NCAT, neck supple  CV: RRR, S1/s2 appreciated w/ gentle systolic murmur  Resp: CTA bilat w/o crackles/wheezes  Abd: soft, nondistended, notender, BS+, uterine fundus firm, below level of umbilicus and appropriately tender  Ext: +1 bilat lower ext soft edema, skin warm, intact and dry     Labs:  Recent Labs      19   0415  19   0413   WBC  16.3*  14.8*   RBC  4.59  4.03*   HEMOGLOBIN  13.2  11.6*   HEMATOCRIT  40.9  36.5*   MCV  89.1  90.6   MCH  28.8  28.8   MCHC  32.3*  31.8*   RDW  43.5  44.6   PLATELETCT  242  182   MPV  10.7  10.6        Activity:   Discharge to home  Pelvic Rest x 6 weeks    Assessment:  Assessment and Plan  25 yo  ,  PPD#1, s/p  @ 39w2d, GBS neg, A+, PNL wnl - delivery yesterday w/o complications and now w/ normal uncomplicated postpartum course:  VSS and CBC w/ appropriate change.  Ambulating well, tolerating PO, pain wll controlled w/ PO analgesics and passing flatus and urinating well.  Desiring to stay this morning and to go home this afternoon  Anticipate discharge tomorrow, on postpartum day #2, but medically stable and anticipate that she would be ready for discharge this evening if she desired to go home later today     Follow up: [facility] or [facility] group in 5 weeks - DESIRES IUD - will discuss placement at f/u appointment  To resume daily PNV and iron supplement if needed with hydration.   Patient  to RT [facility] or ER if any of the following occur:  Fever over 100.5  Severe abdominal pain  Red streaks or painful masses in the breasts  Foul smelling discharge or lochia  Heavy vaginal bleeding saturating a pad per hour  S/s of PP depression     Discharge Meds:   Ibuprofen 800 mg TID PRN pain  Tylenol 1000 mg TID PRN pain  Docusate 100 mg BID daily to prevent constipation  PNV daily    Ilia Mcgraw D.O.

## 2019-06-03 NOTE — CARE PLAN
Problem: Altered physiologic condition related to immediate post-delivery state and potential for bleeding/hemorrhage  Goal: Patient physiologically stable as evidenced by normal lochia, palpable uterine involution and vital signs within normal limits  Outcome: PROGRESSING AS EXPECTED  Fundus firm at umbilicus with light lochia.    Problem: Alteration in comfort related to episiotomy, vaginal repair and/or after birth pains  Goal: Patient is able to ambulate, care for self and infant  Outcome: PROGRESSING AS EXPECTED  Ambulating  and voiding without difficulty.  Goal: Patient verbalizes acceptable pain level  Outcome: PROGRESSING AS EXPECTED  Verbalizes acceptable pain relief with pain medication being given as requested.    Problem: Potential knowledge deficit related to lack of understanding of self and  care  Goal: Patient will demonstrate ability to care for self and infant  Outcome: PROGRESSING SLOWER THAN EXPECTED  Patient requires assistance with breast feeding.    Problem: Potential anxiety related to difficulty adapting to parental role  Goal: Patient will verbalize and demonstrate effective bonding and parenting behavior  Outcome: PROGRESSING AS EXPECTED  Bonding well and doing skin to skin frequently.

## 2019-11-27 NOTE — ADDENDUM NOTE
Encounter addended by: Corie Simpson C.N.M. on: 11/27/2019 2:11 AM   Actions taken: Clinical Note Signed

## 2020-03-10 NOTE — ANESTHESIA POSTPROCEDURE EVALUATION
Patient: Lissette Tong    Procedure Summary     Date:  06/02/19 Room / Location:      Anesthesia Start:  0509 Anesthesia Stop:  1042    Procedure:  Labor Epidural Diagnosis:      Scheduled Providers:   Responsible Provider:  Deb Mendoza M.D.    Anesthesia Type:  epidural ASA Status:  2          Final Anesthesia Type: epidural  Last vitals  BP   Blood Pressure: 118/71    Temp   36.6 °C (97.9 °F)    Pulse   Pulse: 89   Resp   16    SpO2   95 %      Anesthesia Post Evaluation    Patient location during evaluation: PACU  Patient participation: complete - patient participated  Level of consciousness: awake and alert  Pain score: 1    Airway patency: patent  Anesthetic complications: no  Cardiovascular status: hemodynamically stable  Respiratory status: acceptable  Hydration status: euvolemic    PONV: none           Nurse Pain Score: 1 (NPRS)        
Continue statin

## 2021-04-09 ENCOUNTER — HOSPITAL ENCOUNTER (OUTPATIENT)
Dept: LAB | Facility: MEDICAL CENTER | Age: 26
End: 2021-04-09
Attending: STUDENT IN AN ORGANIZED HEALTH CARE EDUCATION/TRAINING PROGRAM
Payer: COMMERCIAL

## 2021-04-09 LAB
BASOPHILS # BLD AUTO: 1 % (ref 0–1.8)
BASOPHILS # BLD: 0.06 K/UL (ref 0–0.12)
EOSINOPHIL # BLD AUTO: 0.09 K/UL (ref 0–0.51)
EOSINOPHIL NFR BLD: 1.5 % (ref 0–6.9)
ERYTHROCYTE [DISTWIDTH] IN BLOOD BY AUTOMATED COUNT: 42.5 FL (ref 35.9–50)
HCT VFR BLD AUTO: 44.9 % (ref 37–47)
HGB BLD-MCNC: 14.4 G/DL (ref 12–16)
IMM GRANULOCYTES # BLD AUTO: 0.02 K/UL (ref 0–0.11)
IMM GRANULOCYTES NFR BLD AUTO: 0.3 % (ref 0–0.9)
LYMPHOCYTES # BLD AUTO: 2.06 K/UL (ref 1–4.8)
LYMPHOCYTES NFR BLD: 33.6 % (ref 22–41)
MCH RBC QN AUTO: 29.9 PG (ref 27–33)
MCHC RBC AUTO-ENTMCNC: 32.1 G/DL (ref 33.6–35)
MCV RBC AUTO: 93.2 FL (ref 81.4–97.8)
MONOCYTES # BLD AUTO: 0.43 K/UL (ref 0–0.85)
MONOCYTES NFR BLD AUTO: 7 % (ref 0–13.4)
NEUTROPHILS # BLD AUTO: 3.47 K/UL (ref 2–7.15)
NEUTROPHILS NFR BLD: 56.6 % (ref 44–72)
NRBC # BLD AUTO: 0 K/UL
NRBC BLD-RTO: 0 /100 WBC
PLATELET # BLD AUTO: 284 K/UL (ref 164–446)
PMV BLD AUTO: 10.7 FL (ref 9–12.9)
RBC # BLD AUTO: 4.82 M/UL (ref 4.2–5.4)
WBC # BLD AUTO: 6.1 K/UL (ref 4.8–10.8)

## 2021-04-09 PROCEDURE — 36415 COLL VENOUS BLD VENIPUNCTURE: CPT

## 2021-04-09 PROCEDURE — 85025 COMPLETE CBC W/AUTO DIFF WBC: CPT

## 2021-06-09 ENCOUNTER — APPOINTMENT (OUTPATIENT)
Dept: RADIOLOGY | Facility: MEDICAL CENTER | Age: 26
End: 2021-06-09
Attending: EMERGENCY MEDICINE
Payer: COMMERCIAL

## 2021-06-09 ENCOUNTER — HOSPITAL ENCOUNTER (OUTPATIENT)
Facility: MEDICAL CENTER | Age: 26
End: 2021-06-10
Attending: EMERGENCY MEDICINE | Admitting: SURGERY
Payer: COMMERCIAL

## 2021-06-09 DIAGNOSIS — G89.18 ACUTE POSTOPERATIVE PAIN: ICD-10-CM

## 2021-06-09 DIAGNOSIS — K35.30 ACUTE APPENDICITIS WITH LOCALIZED PERITONITIS, WITHOUT PERFORATION, ABSCESS, OR GANGRENE: ICD-10-CM

## 2021-06-09 LAB
ALBUMIN SERPL BCP-MCNC: 4.4 G/DL (ref 3.2–4.9)
ALBUMIN/GLOB SERPL: 1.6 G/DL
ALP SERPL-CCNC: 58 U/L (ref 30–99)
ALT SERPL-CCNC: 11 U/L (ref 2–50)
ANION GAP SERPL CALC-SCNC: 11 MMOL/L (ref 7–16)
APPEARANCE UR: ABNORMAL
AST SERPL-CCNC: 13 U/L (ref 12–45)
BACTERIA #/AREA URNS HPF: ABNORMAL /HPF
BASOPHILS # BLD AUTO: 0.3 % (ref 0–1.8)
BASOPHILS # BLD: 0.06 K/UL (ref 0–0.12)
BILIRUB SERPL-MCNC: 0.9 MG/DL (ref 0.1–1.5)
BILIRUB UR QL STRIP.AUTO: NEGATIVE
BUN SERPL-MCNC: 12 MG/DL (ref 8–22)
CALCIUM SERPL-MCNC: 8.8 MG/DL (ref 8.4–10.2)
CHLORIDE SERPL-SCNC: 106 MMOL/L (ref 96–112)
CO2 SERPL-SCNC: 21 MMOL/L (ref 20–33)
COLOR UR: YELLOW
CREAT SERPL-MCNC: 0.57 MG/DL (ref 0.5–1.4)
EOSINOPHIL # BLD AUTO: 0.03 K/UL (ref 0–0.51)
EOSINOPHIL NFR BLD: 0.2 % (ref 0–6.9)
EPI CELLS #/AREA URNS HPF: ABNORMAL /HPF
ERYTHROCYTE [DISTWIDTH] IN BLOOD BY AUTOMATED COUNT: 40.6 FL (ref 35.9–50)
GLOBULIN SER CALC-MCNC: 2.7 G/DL (ref 1.9–3.5)
GLUCOSE SERPL-MCNC: 84 MG/DL (ref 65–99)
GLUCOSE UR STRIP.AUTO-MCNC: NEGATIVE MG/DL
HCG SERPL QL: NEGATIVE
HCT VFR BLD AUTO: 41.6 % (ref 37–47)
HGB BLD-MCNC: 14.1 G/DL (ref 12–16)
IMM GRANULOCYTES # BLD AUTO: 0.11 K/UL (ref 0–0.11)
IMM GRANULOCYTES NFR BLD AUTO: 0.6 % (ref 0–0.9)
KETONES UR STRIP.AUTO-MCNC: >=80 MG/DL
LEUKOCYTE ESTERASE UR QL STRIP.AUTO: ABNORMAL
LIPASE SERPL-CCNC: 26 U/L (ref 7–58)
LYMPHOCYTES # BLD AUTO: 1.84 K/UL (ref 1–4.8)
LYMPHOCYTES NFR BLD: 10.1 % (ref 22–41)
MCH RBC QN AUTO: 30.9 PG (ref 27–33)
MCHC RBC AUTO-ENTMCNC: 33.9 G/DL (ref 33.6–35)
MCV RBC AUTO: 91 FL (ref 81.4–97.8)
MICRO URNS: ABNORMAL
MONOCYTES # BLD AUTO: 0.91 K/UL (ref 0–0.85)
MONOCYTES NFR BLD AUTO: 5 % (ref 0–13.4)
MUCOUS THREADS #/AREA URNS HPF: ABNORMAL /HPF
NEUTROPHILS # BLD AUTO: 15.3 K/UL (ref 2–7.15)
NEUTROPHILS NFR BLD: 83.8 % (ref 44–72)
NITRITE UR QL STRIP.AUTO: NEGATIVE
NRBC # BLD AUTO: 0 K/UL
NRBC BLD-RTO: 0 /100 WBC
PH UR STRIP.AUTO: 6 [PH] (ref 5–8)
PLATELET # BLD AUTO: 229 K/UL (ref 164–446)
PMV BLD AUTO: 9.7 FL (ref 9–12.9)
POTASSIUM SERPL-SCNC: 3.7 MMOL/L (ref 3.6–5.5)
PROT SERPL-MCNC: 7.1 G/DL (ref 6–8.2)
PROT UR QL STRIP: NEGATIVE MG/DL
RBC # BLD AUTO: 4.57 M/UL (ref 4.2–5.4)
RBC # URNS HPF: ABNORMAL /HPF
RBC UR QL AUTO: NEGATIVE
SODIUM SERPL-SCNC: 138 MMOL/L (ref 135–145)
SP GR UR STRIP.AUTO: >=1.03
WBC # BLD AUTO: 18.3 K/UL (ref 4.8–10.8)
WBC #/AREA URNS HPF: ABNORMAL /HPF

## 2021-06-09 PROCEDURE — U0003 INFECTIOUS AGENT DETECTION BY NUCLEIC ACID (DNA OR RNA); SEVERE ACUTE RESPIRATORY SYNDROME CORONAVIRUS 2 (SARS-COV-2) (CORONAVIRUS DISEASE [COVID-19]), AMPLIFIED PROBE TECHNIQUE, MAKING USE OF HIGH THROUGHPUT TECHNOLOGIES AS DESCRIBED BY CMS-2020-01-R: HCPCS

## 2021-06-09 PROCEDURE — 83690 ASSAY OF LIPASE: CPT

## 2021-06-09 PROCEDURE — 96376 TX/PRO/DX INJ SAME DRUG ADON: CPT

## 2021-06-09 PROCEDURE — 700101 HCHG RX REV CODE 250: Performed by: EMERGENCY MEDICINE

## 2021-06-09 PROCEDURE — 96367 TX/PROPH/DG ADDL SEQ IV INF: CPT

## 2021-06-09 PROCEDURE — C9803 HOPD COVID-19 SPEC COLLECT: HCPCS | Performed by: EMERGENCY MEDICINE

## 2021-06-09 PROCEDURE — 76705 ECHO EXAM OF ABDOMEN: CPT

## 2021-06-09 PROCEDURE — 84703 CHORIONIC GONADOTROPIN ASSAY: CPT

## 2021-06-09 PROCEDURE — 81001 URINALYSIS AUTO W/SCOPE: CPT

## 2021-06-09 PROCEDURE — 80053 COMPREHEN METABOLIC PANEL: CPT

## 2021-06-09 PROCEDURE — 700117 HCHG RX CONTRAST REV CODE 255: Performed by: EMERGENCY MEDICINE

## 2021-06-09 PROCEDURE — 87086 URINE CULTURE/COLONY COUNT: CPT

## 2021-06-09 PROCEDURE — U0005 INFEC AGEN DETEC AMPLI PROBE: HCPCS

## 2021-06-09 PROCEDURE — 87426 SARSCOV CORONAVIRUS AG IA: CPT

## 2021-06-09 PROCEDURE — 700105 HCHG RX REV CODE 258: Performed by: EMERGENCY MEDICINE

## 2021-06-09 PROCEDURE — 96365 THER/PROPH/DIAG IV INF INIT: CPT

## 2021-06-09 PROCEDURE — 700111 HCHG RX REV CODE 636 W/ 250 OVERRIDE (IP): Performed by: EMERGENCY MEDICINE

## 2021-06-09 PROCEDURE — 36415 COLL VENOUS BLD VENIPUNCTURE: CPT

## 2021-06-09 PROCEDURE — 96375 TX/PRO/DX INJ NEW DRUG ADDON: CPT

## 2021-06-09 PROCEDURE — 85025 COMPLETE CBC W/AUTO DIFF WBC: CPT

## 2021-06-09 PROCEDURE — 99291 CRITICAL CARE FIRST HOUR: CPT

## 2021-06-09 RX ORDER — MORPHINE SULFATE 4 MG/ML
4 INJECTION, SOLUTION INTRAMUSCULAR; INTRAVENOUS ONCE
Status: COMPLETED | OUTPATIENT
Start: 2021-06-09 | End: 2021-06-09

## 2021-06-09 RX ORDER — SODIUM CHLORIDE 9 MG/ML
1000 INJECTION, SOLUTION INTRAVENOUS ONCE
Status: COMPLETED | OUTPATIENT
Start: 2021-06-09 | End: 2021-06-09

## 2021-06-09 RX ORDER — ONDANSETRON 2 MG/ML
4 INJECTION INTRAMUSCULAR; INTRAVENOUS ONCE
Status: COMPLETED | OUTPATIENT
Start: 2021-06-09 | End: 2021-06-09

## 2021-06-09 RX ADMIN — METRONIDAZOLE 500 MG: 500 INJECTION, SOLUTION INTRAVENOUS at 23:12

## 2021-06-09 RX ADMIN — MORPHINE SULFATE 4 MG: 4 INJECTION INTRAVENOUS at 22:04

## 2021-06-09 RX ADMIN — SODIUM CHLORIDE 1000 ML: 9 INJECTION, SOLUTION INTRAVENOUS at 21:56

## 2021-06-09 RX ADMIN — IOHEXOL 70 ML: 350 INJECTION, SOLUTION INTRAVENOUS at 23:54

## 2021-06-09 RX ADMIN — ONDANSETRON 4 MG: 2 INJECTION INTRAMUSCULAR; INTRAVENOUS at 22:02

## 2021-06-09 RX ADMIN — CEFTRIAXONE SODIUM 1 G: 1 INJECTION, POWDER, FOR SOLUTION INTRAMUSCULAR; INTRAVENOUS at 22:07

## 2021-06-09 ASSESSMENT — PAIN DESCRIPTION - DESCRIPTORS: DESCRIPTORS: SHARP

## 2021-06-09 ASSESSMENT — PAIN DESCRIPTION - PAIN TYPE
TYPE: ACUTE PAIN
TYPE: ACUTE PAIN

## 2021-06-10 ENCOUNTER — ANESTHESIA EVENT (OUTPATIENT)
Dept: SURGERY | Facility: MEDICAL CENTER | Age: 26
End: 2021-06-10
Payer: COMMERCIAL

## 2021-06-10 ENCOUNTER — ANESTHESIA (OUTPATIENT)
Dept: SURGERY | Facility: MEDICAL CENTER | Age: 26
End: 2021-06-10
Payer: COMMERCIAL

## 2021-06-10 VITALS
BODY MASS INDEX: 26.78 KG/M2 | OXYGEN SATURATION: 99 % | DIASTOLIC BLOOD PRESSURE: 72 MMHG | HEART RATE: 65 BPM | WEIGHT: 145.5 LBS | TEMPERATURE: 98 F | SYSTOLIC BLOOD PRESSURE: 123 MMHG | HEIGHT: 62 IN | RESPIRATION RATE: 18 BRPM

## 2021-06-10 PROBLEM — K35.30 ACUTE APPENDICITIS WITH LOCALIZED PERITONITIS, WITHOUT PERFORATION, ABSCESS, OR GANGRENE: Status: ACTIVE | Noted: 2021-06-10

## 2021-06-10 PROBLEM — K35.30 ACUTE APPENDICITIS WITH LOCALIZED PERITONITIS, WITHOUT PERFORATION, ABSCESS, OR GANGRENE: Status: RESOLVED | Noted: 2021-06-10 | Resolved: 2021-06-10

## 2021-06-10 LAB
BLOOD CULTURE HOLD CXBCH: NORMAL
PATHOLOGY CONSULT NOTE: NORMAL
SARS-COV+SARS-COV-2 AG RESP QL IA.RAPID: NOTDETECTED
SARS-COV-2 RNA RESP QL NAA+PROBE: NOTDETECTED
SPECIMEN SOURCE: NORMAL
SPECIMEN SOURCE: NORMAL

## 2021-06-10 PROCEDURE — 501463 HCHG STAPLES, UNIV. ROTIC: Performed by: SURGERY

## 2021-06-10 PROCEDURE — 88304 TISSUE EXAM BY PATHOLOGIST: CPT

## 2021-06-10 PROCEDURE — 500802 HCHG LAPRASONIC ULTRA SHEAR: Performed by: SURGERY

## 2021-06-10 PROCEDURE — 501838 HCHG SUTURE GENERAL: Performed by: SURGERY

## 2021-06-10 PROCEDURE — A9270 NON-COVERED ITEM OR SERVICE: HCPCS | Performed by: SURGERY

## 2021-06-10 PROCEDURE — 500002 HCHG ADHESIVE, DERMABOND: Performed by: SURGERY

## 2021-06-10 PROCEDURE — 502571 HCHG PACK, LAP CHOLE: Performed by: SURGERY

## 2021-06-10 PROCEDURE — 500378 HCHG DRAIN, J-VAC ROUND 19FR: Performed by: SURGERY

## 2021-06-10 PROCEDURE — 160035 HCHG PACU - 1ST 60 MINS PHASE I: Performed by: SURGERY

## 2021-06-10 PROCEDURE — 160029 HCHG SURGERY MINUTES - 1ST 30 MINS LEVEL 4: Performed by: SURGERY

## 2021-06-10 PROCEDURE — 501583 HCHG TROCAR, THRD CAN&SEAL 5X100: Performed by: SURGERY

## 2021-06-10 PROCEDURE — 160048 HCHG OR STATISTICAL LEVEL 1-5: Performed by: SURGERY

## 2021-06-10 PROCEDURE — 700101 HCHG RX REV CODE 250: Performed by: SURGERY

## 2021-06-10 PROCEDURE — 160002 HCHG RECOVERY MINUTES (STAT): Performed by: SURGERY

## 2021-06-10 PROCEDURE — 700111 HCHG RX REV CODE 636 W/ 250 OVERRIDE (IP): Performed by: EMERGENCY MEDICINE

## 2021-06-10 PROCEDURE — 74177 CT ABD & PELVIS W/CONTRAST: CPT

## 2021-06-10 PROCEDURE — 700111 HCHG RX REV CODE 636 W/ 250 OVERRIDE (IP): Performed by: ANESTHESIOLOGY

## 2021-06-10 PROCEDURE — 160009 HCHG ANES TIME/MIN: Performed by: SURGERY

## 2021-06-10 PROCEDURE — G0378 HOSPITAL OBSERVATION PER HR: HCPCS

## 2021-06-10 PROCEDURE — 500868 HCHG NEEDLE, SURGI(VARES): Performed by: SURGERY

## 2021-06-10 PROCEDURE — 700101 HCHG RX REV CODE 250: Performed by: ANESTHESIOLOGY

## 2021-06-10 PROCEDURE — 501570 HCHG TROCAR, SEPARATOR: Performed by: SURGERY

## 2021-06-10 PROCEDURE — 501429 HCHG STAPLER, ENDO GIA UNIV: Performed by: SURGERY

## 2021-06-10 PROCEDURE — 160041 HCHG SURGERY MINUTES - EA ADDL 1 MIN LEVEL 4: Performed by: SURGERY

## 2021-06-10 PROCEDURE — 501399 HCHG SPECIMAN BAG, ENDO CATC: Performed by: SURGERY

## 2021-06-10 PROCEDURE — 700105 HCHG RX REV CODE 258: Performed by: SURGERY

## 2021-06-10 PROCEDURE — 700102 HCHG RX REV CODE 250 W/ 637 OVERRIDE(OP): Performed by: SURGERY

## 2021-06-10 RX ORDER — HYDROMORPHONE HYDROCHLORIDE 1 MG/ML
0.2 INJECTION, SOLUTION INTRAMUSCULAR; INTRAVENOUS; SUBCUTANEOUS
Status: DISCONTINUED | OUTPATIENT
Start: 2021-06-10 | End: 2021-06-10 | Stop reason: HOSPADM

## 2021-06-10 RX ORDER — HALOPERIDOL 5 MG/ML
1 INJECTION INTRAMUSCULAR
Status: DISCONTINUED | OUTPATIENT
Start: 2021-06-10 | End: 2021-06-10 | Stop reason: HOSPADM

## 2021-06-10 RX ORDER — IBUPROFEN 800 MG/1
800 TABLET ORAL
Qty: 21 TABLET | Refills: 0 | Status: SHIPPED | OUTPATIENT
Start: 2021-06-10 | End: 2021-06-17

## 2021-06-10 RX ORDER — OXYCODONE HYDROCHLORIDE 5 MG/1
5 TABLET ORAL EVERY 6 HOURS PRN
Qty: 12 TABLET | Refills: 0 | Status: SHIPPED | OUTPATIENT
Start: 2021-06-10 | End: 2021-06-13

## 2021-06-10 RX ORDER — MIDAZOLAM HYDROCHLORIDE 1 MG/ML
1 INJECTION INTRAMUSCULAR; INTRAVENOUS
Status: DISCONTINUED | OUTPATIENT
Start: 2021-06-10 | End: 2021-06-10 | Stop reason: HOSPADM

## 2021-06-10 RX ORDER — ONDANSETRON 2 MG/ML
INJECTION INTRAMUSCULAR; INTRAVENOUS PRN
Status: DISCONTINUED | OUTPATIENT
Start: 2021-06-10 | End: 2021-06-10 | Stop reason: SURG

## 2021-06-10 RX ORDER — SODIUM CHLORIDE, SODIUM LACTATE, POTASSIUM CHLORIDE, CALCIUM CHLORIDE 600; 310; 30; 20 MG/100ML; MG/100ML; MG/100ML; MG/100ML
INJECTION, SOLUTION INTRAVENOUS CONTINUOUS
Status: DISCONTINUED | OUTPATIENT
Start: 2021-06-10 | End: 2021-06-10 | Stop reason: HOSPADM

## 2021-06-10 RX ORDER — ACETAMINOPHEN 500 MG
1000 TABLET ORAL EVERY 6 HOURS
Qty: 56 TABLET | Refills: 0 | Status: SHIPPED | OUTPATIENT
Start: 2021-06-10 | End: 2021-06-17

## 2021-06-10 RX ORDER — KETOROLAC TROMETHAMINE 30 MG/ML
INJECTION, SOLUTION INTRAMUSCULAR; INTRAVENOUS PRN
Status: DISCONTINUED | OUTPATIENT
Start: 2021-06-10 | End: 2021-06-10 | Stop reason: SURG

## 2021-06-10 RX ORDER — DIPHENHYDRAMINE HYDROCHLORIDE 50 MG/ML
12.5 INJECTION INTRAMUSCULAR; INTRAVENOUS
Status: DISCONTINUED | OUTPATIENT
Start: 2021-06-10 | End: 2021-06-10 | Stop reason: HOSPADM

## 2021-06-10 RX ORDER — MIDAZOLAM HYDROCHLORIDE 1 MG/ML
INJECTION INTRAMUSCULAR; INTRAVENOUS PRN
Status: DISCONTINUED | OUTPATIENT
Start: 2021-06-10 | End: 2021-06-10 | Stop reason: SURG

## 2021-06-10 RX ORDER — SODIUM CHLORIDE, SODIUM LACTATE, POTASSIUM CHLORIDE, CALCIUM CHLORIDE 600; 310; 30; 20 MG/100ML; MG/100ML; MG/100ML; MG/100ML
INJECTION, SOLUTION INTRAVENOUS CONTINUOUS
Status: ACTIVE | OUTPATIENT
Start: 2021-06-10 | End: 2021-06-10

## 2021-06-10 RX ORDER — OXYCODONE HYDROCHLORIDE 5 MG/1
5 TABLET ORAL EVERY 4 HOURS PRN
Status: DISCONTINUED | OUTPATIENT
Start: 2021-06-10 | End: 2021-06-10 | Stop reason: HOSPADM

## 2021-06-10 RX ORDER — OXYCODONE HCL 5 MG/5 ML
10 SOLUTION, ORAL ORAL
Status: DISCONTINUED | OUTPATIENT
Start: 2021-06-10 | End: 2021-06-10 | Stop reason: HOSPADM

## 2021-06-10 RX ORDER — CEFOTETAN DISODIUM 2 G/20ML
INJECTION, POWDER, FOR SOLUTION INTRAMUSCULAR; INTRAVENOUS PRN
Status: DISCONTINUED | OUTPATIENT
Start: 2021-06-10 | End: 2021-06-10 | Stop reason: SURG

## 2021-06-10 RX ORDER — DEXAMETHASONE SODIUM PHOSPHATE 4 MG/ML
INJECTION, SOLUTION INTRA-ARTICULAR; INTRALESIONAL; INTRAMUSCULAR; INTRAVENOUS; SOFT TISSUE PRN
Status: DISCONTINUED | OUTPATIENT
Start: 2021-06-10 | End: 2021-06-10 | Stop reason: SURG

## 2021-06-10 RX ORDER — MORPHINE SULFATE 4 MG/ML
2 INJECTION, SOLUTION INTRAMUSCULAR; INTRAVENOUS ONCE
Status: COMPLETED | OUTPATIENT
Start: 2021-06-10 | End: 2021-06-10

## 2021-06-10 RX ORDER — BUPIVACAINE HYDROCHLORIDE AND EPINEPHRINE 5; 5 MG/ML; UG/ML
INJECTION, SOLUTION EPIDURAL; INTRACAUDAL; PERINEURAL
Status: DISCONTINUED | OUTPATIENT
Start: 2021-06-10 | End: 2021-06-10 | Stop reason: HOSPADM

## 2021-06-10 RX ORDER — LIDOCAINE HYDROCHLORIDE 20 MG/ML
INJECTION, SOLUTION EPIDURAL; INFILTRATION; INTRACAUDAL; PERINEURAL PRN
Status: DISCONTINUED | OUTPATIENT
Start: 2021-06-10 | End: 2021-06-10 | Stop reason: SURG

## 2021-06-10 RX ORDER — HYDROMORPHONE HYDROCHLORIDE 2 MG/ML
INJECTION, SOLUTION INTRAMUSCULAR; INTRAVENOUS; SUBCUTANEOUS PRN
Status: DISCONTINUED | OUTPATIENT
Start: 2021-06-10 | End: 2021-06-10 | Stop reason: SURG

## 2021-06-10 RX ORDER — OXYCODONE HCL 5 MG/5 ML
5 SOLUTION, ORAL ORAL
Status: DISCONTINUED | OUTPATIENT
Start: 2021-06-10 | End: 2021-06-10 | Stop reason: HOSPADM

## 2021-06-10 RX ORDER — HYDROMORPHONE HYDROCHLORIDE 1 MG/ML
0.5 INJECTION, SOLUTION INTRAMUSCULAR; INTRAVENOUS; SUBCUTANEOUS
Status: DISCONTINUED | OUTPATIENT
Start: 2021-06-10 | End: 2021-06-10 | Stop reason: HOSPADM

## 2021-06-10 RX ORDER — HYDROMORPHONE HYDROCHLORIDE 1 MG/ML
0.4 INJECTION, SOLUTION INTRAMUSCULAR; INTRAVENOUS; SUBCUTANEOUS
Status: DISCONTINUED | OUTPATIENT
Start: 2021-06-10 | End: 2021-06-10 | Stop reason: HOSPADM

## 2021-06-10 RX ORDER — MEPERIDINE HYDROCHLORIDE 25 MG/ML
12.5 INJECTION INTRAMUSCULAR; INTRAVENOUS; SUBCUTANEOUS
Status: DISCONTINUED | OUTPATIENT
Start: 2021-06-10 | End: 2021-06-10 | Stop reason: HOSPADM

## 2021-06-10 RX ORDER — KETOROLAC TROMETHAMINE 30 MG/ML
15 INJECTION, SOLUTION INTRAMUSCULAR; INTRAVENOUS EVERY 6 HOURS
Status: DISCONTINUED | OUTPATIENT
Start: 2021-06-10 | End: 2021-06-10 | Stop reason: HOSPADM

## 2021-06-10 RX ORDER — ROCURONIUM BROMIDE 10 MG/ML
INJECTION, SOLUTION INTRAVENOUS PRN
Status: DISCONTINUED | OUTPATIENT
Start: 2021-06-10 | End: 2021-06-10 | Stop reason: SURG

## 2021-06-10 RX ORDER — ACETAMINOPHEN 500 MG
1000 TABLET ORAL EVERY 6 HOURS
Status: DISCONTINUED | OUTPATIENT
Start: 2021-06-10 | End: 2021-06-10 | Stop reason: HOSPADM

## 2021-06-10 RX ADMIN — SUGAMMADEX 200 MG: 100 INJECTION, SOLUTION INTRAVENOUS at 02:11

## 2021-06-10 RX ADMIN — OXYCODONE HYDROCHLORIDE 5 MG: 5 TABLET ORAL at 03:42

## 2021-06-10 RX ADMIN — MIDAZOLAM HYDROCHLORIDE 2 MG: 1 INJECTION, SOLUTION INTRAMUSCULAR; INTRAVENOUS at 01:43

## 2021-06-10 RX ADMIN — HYDROMORPHONE HYDROCHLORIDE 0.5 MG: 2 INJECTION, SOLUTION INTRAMUSCULAR; INTRAVENOUS; SUBCUTANEOUS at 02:03

## 2021-06-10 RX ADMIN — CEFOTETAN DISODIUM 2 G: 2 INJECTION, POWDER, FOR SOLUTION INTRAMUSCULAR; INTRAVENOUS at 01:43

## 2021-06-10 RX ADMIN — PROPOFOL 150 MG: 10 INJECTION, EMULSION INTRAVENOUS at 01:46

## 2021-06-10 RX ADMIN — PROPOFOL 50 MG: 10 INJECTION, EMULSION INTRAVENOUS at 01:47

## 2021-06-10 RX ADMIN — HYDROMORPHONE HYDROCHLORIDE 0.5 MG: 2 INJECTION, SOLUTION INTRAMUSCULAR; INTRAVENOUS; SUBCUTANEOUS at 01:46

## 2021-06-10 RX ADMIN — DEXAMETHASONE SODIUM PHOSPHATE 8 MG: 4 INJECTION, SOLUTION INTRAMUSCULAR; INTRAVENOUS at 01:49

## 2021-06-10 RX ADMIN — ACETAMINOPHEN 1000 MG: 500 TABLET ORAL at 03:41

## 2021-06-10 RX ADMIN — ONDANSETRON 4 MG: 2 INJECTION INTRAMUSCULAR; INTRAVENOUS at 02:06

## 2021-06-10 RX ADMIN — LIDOCAINE HYDROCHLORIDE 80 MG: 20 INJECTION, SOLUTION EPIDURAL; INFILTRATION; INTRACAUDAL; PERINEURAL at 01:46

## 2021-06-10 RX ADMIN — ROCURONIUM BROMIDE 50 MG: 10 INJECTION, SOLUTION INTRAVENOUS at 01:46

## 2021-06-10 RX ADMIN — KETOROLAC TROMETHAMINE 30 MG: 30 INJECTION, SOLUTION INTRAMUSCULAR at 02:11

## 2021-06-10 RX ADMIN — SODIUM CHLORIDE, POTASSIUM CHLORIDE, SODIUM LACTATE AND CALCIUM CHLORIDE: 600; 310; 30; 20 INJECTION, SOLUTION INTRAVENOUS at 01:43

## 2021-06-10 RX ADMIN — MORPHINE SULFATE 2 MG: 4 INJECTION INTRAVENOUS at 00:34

## 2021-06-10 ASSESSMENT — LIFESTYLE VARIABLES
ON A TYPICAL DAY WHEN YOU DRINK ALCOHOL HOW MANY DRINKS DO YOU HAVE: 0
HOW MANY TIMES IN THE PAST YEAR HAVE YOU HAD 5 OR MORE DRINKS IN A DAY: 0
TOTAL SCORE: 0
AVERAGE NUMBER OF DAYS PER WEEK YOU HAVE A DRINK CONTAINING ALCOHOL: 0
EVER HAD A DRINK FIRST THING IN THE MORNING TO STEADY YOUR NERVES TO GET RID OF A HANGOVER: NO
HAVE PEOPLE ANNOYED YOU BY CRITICIZING YOUR DRINKING: NO
ALCOHOL_USE: NO
CONSUMPTION TOTAL: NEGATIVE
TOTAL SCORE: 0
HAVE YOU EVER FELT YOU SHOULD CUT DOWN ON YOUR DRINKING: NO
TOTAL SCORE: 0
EVER FELT BAD OR GUILTY ABOUT YOUR DRINKING: NO

## 2021-06-10 ASSESSMENT — PAIN DESCRIPTION - PAIN TYPE
TYPE: ACUTE PAIN;SURGICAL PAIN
TYPE: ACUTE PAIN

## 2021-06-10 ASSESSMENT — PATIENT HEALTH QUESTIONNAIRE - PHQ9
1. LITTLE INTEREST OR PLEASURE IN DOING THINGS: NOT AT ALL
SUM OF ALL RESPONSES TO PHQ9 QUESTIONS 1 AND 2: 0
2. FEELING DOWN, DEPRESSED, IRRITABLE, OR HOPELESS: NOT AT ALL
1. LITTLE INTEREST OR PLEASURE IN DOING THINGS: NOT AT ALL
SUM OF ALL RESPONSES TO PHQ9 QUESTIONS 1 AND 2: 0

## 2021-06-10 ASSESSMENT — PAIN SCALES - GENERAL: PAIN_LEVEL: 2

## 2021-06-10 NOTE — DISCHARGE INSTRUCTIONS
Discharge Instructions    Discharged to home by car with relative. Discharged via wheelchair, hospital escort: Yes.  Special equipment needed: Not Applicable    Be sure to schedule a follow-up appointment with your primary care doctor or any specialists as instructed.     Discharge Plan:        I understand that a diet low in cholesterol, fat, and sodium is recommended for good health. Unless I have been given specific instructions below for another diet, I accept this instruction as my diet prescription.   Other diet: N/A    Special Instructions: None    · Is patient discharged on Warfarin / Coumadin?   No     Depression / Suicide Risk    As you are discharged from this [facility] facility, it is important to learn how to keep safe from harming yourself.    Recognize the warning signs:  · Abrupt changes in personality, positive or negative- including increase in energy   · Giving away possessions  · Change in eating patterns- significant weight changes-  positive or negative  · Change in sleeping patterns- unable to sleep or sleeping all the time   · Unwillingness or inability to communicate  · Depression  · Unusual sadness, discouragement and loneliness  · Talk of wanting to die  · Neglect of personal appearance   · Rebelliousness- reckless behavior  · Withdrawal from people/activities they love  · Confusion- inability to concentrate     If you or a loved one observes any of these behaviors or has concerns about self-harm, here's what you can do:  · Talk about it- your feelings and reasons for harming yourself  · Remove any means that you might use to hurt yourself (examples: pills, rope, extension cords, firearm)  · Get professional help from the community (Mental Health, Substance Abuse, psychological counseling)  · Do not be alone:Call your Safe Contact- someone whom you trust who will be there for you.  · Call your local CRISIS HOTLINE 778-2989 or 540-386-3004  · Call your local Children's Mobile Crisis  Response Team [facility] (036) 372-6599 or www.InCast  · Call the toll free National Suicide Prevention Hotlines   · National Suicide Prevention Lifeline 623-977-NEQI (9513)  · [facility] 800-SUICIDE (481-3135)            Laparoscopic Appendectomy D/C instructions:     1. DIET: Upon discharge from the hospital you may resume your normal pre-operative diet. Depending on how you are feeling and whether you have nausea or not, you may wish to stay with a bland diet for the first few days. However, you can advance this as quickly as you feel ready.    2. ACTIVITIES: After discharge from the hospital, you may resume full routine activities. However, there should be no heavy lifting (greater than 15 pounds) and no strenuous activities until after your follow-up visit. Otherwise, routine activities of daily living are acceptable.    3. DRIVING: You may drive whenever you are off pain medications and are able to perform the activities needed to drive, i.e. turning, bending, twisting, etc.    4. BATHING: You may get the wound wet at any time after leaving the hospital. You may shower, but do not submerge in a bath for at least a week.    5. BOWEL FUNCTION: A few patients, after this operation, will develop either frequent or loose stools after meals. This usually corrects itself after a few days, to a few weeks. If this occurs, do not worry; it is not unusual and will resolve. Much more common than loose stools, is constipation. The combination of pain medication and decreased activity level can cause constipation in otherwise normal patients. If you feel this is occurring, take a laxative (Milk of Magnesia, Ex-Lax, Senokot, etc.) until the problem has resolved.    6. PAIN MEDICATION: You will be given a prescription for pain medication at discharge. Please take these as directed. It is important to remember not to take medications on an empty stomach as this may cause nausea.    7. CALL IF YOU  HAVE: (1) Fevers to more than 101.5 degrees F, (2) Unusual chest or leg pain, (3) Drainage or fluid from incision that may be foul smelling, increased tenderness or soreness at the wound or the wound edges are no longer together, redness or swelling at the incision site. Please do not hesitate to call with any other questions.     8. APPOINTMENT: Contact our office at [phone] for a follow-up appointment in 1 to 2 weeks following your procedure.    If you have any additional questions, please do not hesitate to call the office and speak to either myself or the physician on call.    Office address:  [address] [place] [address], [place] [address]      Oneil Hernandez M.D.  [facility]  [phone]

## 2021-06-10 NOTE — CARE PLAN
The patient is Stable - Low risk of patient condition declining or worsening    Shift Goals  Clinical Goals: Discharge to home   Patient Goals: comfort  Family Goals: Go home    Progress made toward(s) clinical / shift goals:  Patient discharged to home with instructions discussed  Problem: Knowledge Deficit - Standard  Goal: Patient and family/care givers will demonstrate understanding of plan of care, disease process/condition, diagnostic tests and medications  Outcome: Progressing     Problem: Pain - Standard  Goal: Alleviation of pain or a reduction in pain to the patient’s comfort goal  Outcome: Progressing       Patient is not progressing towards the following goals:

## 2021-06-10 NOTE — CARE PLAN
The patient is Stable - Low risk of patient condition declining or worsening    Shift Goals  Clinical Goals: pain management  Patient Goals: comfort  Family Goals: Go home    Progress made toward(s) clinical / shift goals:  Pt had some jello and pudding with no complains of nausea or vomiting.    Patient is not progressing towards the following goals:      Problem: Knowledge Deficit - Standard  Goal: Patient and family/care givers will demonstrate understanding of plan of care, disease process/condition, diagnostic tests and medications  Outcome: Progressing     Problem: Pain - Standard  Goal: Alleviation of pain or a reduction in pain to the patient’s comfort goal  Outcome: Progressing

## 2021-06-10 NOTE — PROGRESS NOTES
CT screening completed with patient. Patient understands with no questions. Patient consents to CT contrast study

## 2021-06-10 NOTE — ANESTHESIA PROCEDURE NOTES
Airway    Date/Time: 6/10/2021 1:47 AM  Performed by: Ilia Griggs M.D.  Authorized by: Ilia Griggs M.D.     Location:  OR  Urgency:  Elective  Difficult Airway: No    Indications for Airway Management:  Anesthesia      Spontaneous Ventilation: absent    Sedation Level:  Deep  Preoxygenated: Yes    Patient Position:  Sniffing  Mask Difficulty Assessment:  1 - vent by mask  Final Airway Type:  Endotracheal airway  Final Endotracheal Airway:  ETT  Cuffed: Yes    Technique Used for Successful ETT Placement:  Direct laryngoscopy    Insertion Site:  Oral  Blade Type:  Nedra  Laryngoscope Blade/Videolaryngoscope Blade Size:  3  ETT Size (mm):  6.5  Measured from:  Lips  ETT to Lips (cm):  21  Placement Verified by: auscultation and capnometry    Cormack-Lehane Classification:  Grade I - full view of glottis  Number of Attempts at Approach:  1

## 2021-06-10 NOTE — PROGRESS NOTES
4 Eyes Skin Assessment Completed by Adina RN and LOGAN Morris.    Head WDL  Ears WDL  Nose WDL  Mouth WDL  Neck WDL  Breast/Chest WDL  Shoulder Blades WDL  Spine WDL  (R) Arm/Elbow/Hand WDL  (L) Arm/Elbow/Hand WDL  Abdomen Incision 3lap sites to the abdomen  Groin WDL  Scrotum/Coccyx/Buttocks WDL  (R) Leg WDL  (L) Leg WDL  (R) Heel/Foot/Toe WDL  (L) Heel/Foot/Toe Jaundice          Devices In Places Blood Pressure Cuff, Pulse Ox and SCD's      Interventions In Place Pillows    Possible Skin Injury No    Pictures Uploaded Into Epic N/A  Wound Consult Placed N/A  RN Wound Prevention Protocol Ordered No

## 2021-06-10 NOTE — ED TRIAGE NOTES
Pt arrives to ED through front entrance with C/O ABD pain x2 days. Pt reports pain initially was periumbilical now LRQ, described as sharp in nature, positional/worse with movement. Pt reports some cramping with urination, denies blood in urine. Pt reports episodes of transient nausea with pain without vomiting.

## 2021-06-10 NOTE — ED NOTES
Pt resting comfortably in room at this time with lights dimmed,  at the bedside, denies pain at this time, gave pt mouth swab for comfort, reminded she is strict NPO since scheduled for surgery tonight. Call light in reach, will continue to monitor.

## 2021-06-10 NOTE — OP REPORT
Operative Report    Date: 6/10/2021    PreOp Diagnosis:   1.  Acute appendicitis    PostOp Diagnosis:   1.  Acute uncomplicated appendicitis    Procedure(s):  APPENDECTOMY, LAPAROSCOPIC - Wound Class: Clean    Surgeon(s):  Oneil Hernandez M.D.    Assistant: None    Anesthesiologist/Type of Anesthesia:  Anesthesiologist: Ilia Griggs M.D./General    Surgical Staff:  Circulator: Danita Clark R.N.  Scrub Person: Delmar Hou    Specimens removed if any:  ID Type Source Tests Collected by Time Destination   A : Appendix Tissue Appendix PATHOLOGY SPECIMEN Oneil Hernandez M.D. 6/10/2021  2:06 AM        Estimated Blood Loss: 5 mL    Findings: Acutely inflamed appendix without evidence of perforation or purulent peritonitis    Complications: None noted    Outcome: Transferred to PACU in stable condition    Indications:  26-year-old female presenting with clinical signs and symptoms of acute uncomplicated appendicitis for which the above procedure was recommended.  It was discussed with her in detail include the risk, benefits, and alternatives, she wished to proceed.    Procedure in detail: The patient was identified in the pre-operative holding area and brought to the operating room. Correct side and site were identified. Pre-operative antibiotics were administered prior to the procedure. GETA was smoothly induced. The patient was prepped and draped in the usual sterile fashion.     After infiltration of local anesthetic, an incision was made inferior to the umbilicus to accomodate a 5 mm trocar. The veress needle was placed intraperitoneally, and sterile saline drop test was performed. The abdomen was insufflated to 15 mmHg, which the patient tolerated well, with initially low insufflation pressures. A 5 mm trocar was placed, and a 5 mm 30 degree lapararoscope was used to survey the abdomen. No evidence of intraperitoneal trauma from Veress or trocar placement was found.    I then placed, under video assistance, a  5 mm trocar in the suprapubic area, and a 12 mm trocar in the left lower quadrant. I visualized the cecum and found the appendix, it was acutely inflamed.  There is no evidence of perforation or purulent peritonitis. The appendix was elevated with an atraumatic grasper, a window was created in its mesentery with a [place] dissector.  The base of the appendix was divided with a blue load of the endoGIA stapler, and the appendiceal mesentery was divided with the harmonic scalpel.  The appendix was placed in an endocatch bag and removed from the 12 mm port site.  There is a small amount of serous pelvic fluid and copious irrigation was performed. The appendiceal artery and base were hemostatic.    All ports were removed with video assist, and were hemostatic. The 12 mm port site fascia was closed with 0 vicryl suture. All skin sites were closed with subcuticular sutre and Dermabond.    The patient was awakened from general anesthetic, and was taken to the recovery room in stable condition.    Sponge and needle counts were correct at the end of the case.       Oneil Hernandez M.D.  [facility]  [phone]      6/10/2021 2:17 AM Oneil Hernandez M.D.

## 2021-06-10 NOTE — H&P
Surgical H&P    Date: 6/10/2021    Requesting Physician: Dr. Ruff  PCP: Earlene Hooks M.D.  Attending Physician: Oneil Hernandez M.D.    CC: Right lower quadrant pain    HPI: This is a 26 y.o. female who is presenting with 48 hours of abdominal pain that migrated to the right lower quadrant associated with nausea.  She has a leukocytosis and CT show evidence of acute uncomplicated appendicitis but with periappendiceal fat stranding.  No chest pain, shortness of breath, hematemesis, hemoptysis, hematochezia, melena, neurologic changes.    Past Medical History:   Diagnosis Date   • ASTHMA        History reviewed. No pertinent surgical history.    Current Facility-Administered Medications   Medication Dose Route Frequency Provider Last Rate Last Admin   • lactated ringers infusion   Intravenous Continuous Oneil Hernandez M.D.           Social History     Socioeconomic History   • Marital status: Single     Spouse name: Not on file   • Number of children: Not on file   • Years of education: Not on file   • Highest education level: Not on file   Occupational History   • Not on file   Tobacco Use   • Smoking status: Never Smoker   • Smokeless tobacco: Never Used   Substance and Sexual Activity   • Alcohol use: No   • Drug use: No   • Sexual activity: Yes     Partners: Male   Other Topics Concern   • Not on file   Social History Narrative   • Not on file     Social Determinants of Health     Financial Resource Strain:    • Difficulty of Paying Living Expenses:    Food Insecurity:    • Worried About Running Out of Food in the Last Year:    • Ran Out of Food in the Last Year:    Transportation Needs:    • Lack of Transportation (Medical):    • Lack of Transportation (Non-Medical):    Physical Activity:    • Days of Exercise per Week:    • Minutes of Exercise per Session:    Stress:    • Feeling of Stress :    Social Connections:    • Frequency of Communication with Friends and Family:    • Frequency of Social Gatherings  "with Friends and Family:    • Attends Judaism Services:    • Active Member of Clubs or Organizations:    • Attends Club or Organization Meetings:    • Marital Status:    Intimate Partner Violence:    • Fear of Current or Ex-Partner:    • Emotionally Abused:    • Physically Abused:    • Sexually Abused:        History reviewed. No pertinent family history.    Allergies:  Patient has no known allergies.    Review of Systems:  Negative except as noted above in HPI on 10 point review    Physical Exam:  /76   Pulse 82   Temp 36.7 °C (98.1 °F)   Resp 16   Ht 1.575 m (5' 2\")   Wt 66 kg (145 lb 8.1 oz)   SpO2 97%     Constitutional: she is oriented to person, place, and time.  she appears well-developed and well-nourished. No acute distress.   Head: Normocephalic and atraumatic.   Neck: Normal range of motion. Neck supple. No JVD present. No tracheal deviation present.  Cardiovascular: Normal rate, regular rhythm  Pulmonary/Chest: Breathing is nonlabored on room air.  No stridor, no respiratory distress  Abdominal: Soft, nondistended.  Tender to palpation the right lower quadrant with mild voluntary guarding  Musculoskeletal: Normal range of motion. she exhibits no edema and no tenderness.   Neurological: she is alert and oriented to person, place, and time.  No gross motor or sensory deficit  Skin: Skin is warm and dry. No rash noted. she is not diaphoretic. No erythema. No pallor.   Psychiatric: she has a normal mood and affect.  Behavior is normal.       Labs:  Recent Labs     06/09/21 2000   WBC 18.3*   RBC 4.57   HEMOGLOBIN 14.1   HEMATOCRIT 41.6   MCV 91.0   MCH 30.9   MCHC 33.9   RDW 40.6   PLATELETCT 229   MPV 9.7     Recent Labs     06/09/21 2000   SODIUM 138   POTASSIUM 3.7   CHLORIDE 106   CO2 21   GLUCOSE 84   BUN 12   CREATININE 0.57   CALCIUM 8.8         Recent Labs     06/09/21 2000   ASTSGOT 13   ALTSGPT 11   TBILIRUBIN 0.9   ALKPHOSPHAT 58   GLOBULIN 2.7       Radiology:  CT-ABDOMEN-PELVIS " WITH   Final Result         1.  Dilated appendix with prominent mucosal enhancement and periappendiceal fat stranding in keeping with acute appendicitis. No evidence of perforation or intra-abdominal abscess.   2.  Liquid stool may suggest diarrhea..               US-APPENDIX   Final Result      Findings are suggestive of acute appendicitis.          Assessment: This is a 26 y.o. female with evidence of acute uncomplicated appendicitis.  Hemodynamically stable      Plan:   -Proceed to the OR for laparoscopic appendectomy.  The operation was discussed along with the risks, which include but are not limited to bleeding, infection, damage to surrounding structures, need for further procedures, perioperative abscess, pneumonia, death.  The benefits and alternatives were also discussed and the patient wishes to proceed.  -Disposition pending intraoperative findings, but likely will be able to go home later this morning    Oneil Hernandez M.D.  [facility]  [phone]

## 2021-06-10 NOTE — ED PROVIDER NOTES
"ED Provider Note    CHIEF COMPLAINT  Chief Complaint   Patient presents with   • Abdominal Pain     Abd pain x2 days, initially umbilical area now to LRQ        HPI  Lissette Tong is a 26 y.o. female who presents abdominal pain that is now moved to the right lower quadrant.  Patient states that initially it was more in the middle of her abdomen and then today is moved to the right lower quadrant.  Its been associated with nausea.  She has felt subjectively hot as well as had an episode of diarrhea.  Patient denies any dysuria, vomiting, sore throat, chest pain, shortness of breath, cough, known COVID-19 exposure    REVIEW OF SYSTEMS  Pertinent positives include abdominal pain, nausea, diarrhea. Pertinent negatives include as above. All other systems negative.    PAST MEDICAL HISTORY   has a past medical history of ASTHMA.    SOCIAL HISTORY  Social History     Tobacco Use   • Smoking status: Never Smoker   • Smokeless tobacco: Never Used   Substance and Sexual Activity   • Alcohol use: No   • Drug use: No   • Sexual activity: Yes     Partners: Male       SURGICAL HISTORY  patient denies any surgical history    CURRENT MEDICATIONS  Home Medications     Reviewed by Filemon Redd R.N. (Registered Nurse) on 06/09/21 at [facility]  Med List Status: Not Addressed   Medication Last Dose Status        Patient Michael Taking any Medications                       ALLERGIES  No Known Allergies    PHYSICAL EXAM  VITAL SIGNS: /76   Pulse 82   Temp 36.7 °C (98.1 °F)   Resp 16   Ht 1.575 m (5' 2\")   Wt 66 kg (145 lb 8.1 oz)   SpO2 97%   BMI 26.61 kg/m²   Constitutional: Well developed, Well nourished, mild distress.   HENT: Normocephalic, Atraumatic, mask in place.   Eyes: Conjunctiva normal, No discharge.   Cardiovascular: Normal heart rate, Normal rhythm, No murmurs, equal pulses.   Pulmonary: Normal breath sounds, No respiratory distress, No wheezing, No rales, No rhonchi.  Abdomen:Soft, patient has pain and " tenderness to palpation over McBurney's point.  Slight rebound tenderness, as well as guarding, negative Rovsing sign, positive heeltap, No masses.    Back: No CVA tenderness.   Musculoskeletal: No major deformities noted, No tenderness.   Skin: Warm, Dry, No erythema, No rash.   Neurologic: Alert & oriented x 3, Normal motor function,  No focal deficits noted.   Psychiatric: Affect normal, Judgment normal, Mood normal.       RADIOLOGY/PROCEDURES  CT-ABDOMEN-PELVIS WITH   Final Result         1.  Dilated appendix with prominent mucosal enhancement and periappendiceal fat stranding in keeping with acute appendicitis. No evidence of perforation or intra-abdominal abscess.   2.  Liquid stool may suggest diarrhea..               US-APPENDIX   Final Result      Findings are suggestive of acute appendicitis.          Laboratory tests  Results for orders placed or performed during the hospital encounter of 06/09/21   CBC WITH DIFFERENTIAL   Result Value Ref Range    WBC 18.3 (H) 4.8 - 10.8 K/uL    RBC 4.57 4.20 - 5.40 M/uL    Hemoglobin 14.1 12.0 - 16.0 g/dL    Hematocrit 41.6 37.0 - 47.0 %    MCV 91.0 81.4 - 97.8 fL    MCH 30.9 27.0 - 33.0 pg    MCHC 33.9 33.6 - 35.0 g/dL    RDW 40.6 35.9 - 50.0 fL    Platelet Count 229 164 - 446 K/uL    MPV 9.7 9.0 - 12.9 fL    Neutrophils-Polys 83.80 (H) 44.00 - 72.00 %    Lymphocytes 10.10 (L) 22.00 - 41.00 %    Monocytes 5.00 0.00 - 13.40 %    Eosinophils 0.20 0.00 - 6.90 %    Basophils 0.30 0.00 - 1.80 %    Immature Granulocytes 0.60 0.00 - 0.90 %    Nucleated RBC 0.00 /100 WBC    Neutrophils (Absolute) 15.30 (H) 2.00 - 7.15 K/uL    Lymphs (Absolute) 1.84 1.00 - 4.80 K/uL    Monos (Absolute) 0.91 (H) 0.00 - 0.85 K/uL    Eos (Absolute) 0.03 0.00 - 0.51 K/uL    Baso (Absolute) 0.06 0.00 - 0.12 K/uL    Immature Granulocytes (abs) 0.11 0.00 - 0.11 K/uL    NRBC (Absolute) 0.00 K/uL   COMP METABOLIC PANEL   Result Value Ref Range    Sodium 138 135 - 145 mmol/L    Potassium 3.7 3.6 - 5.5  mmol/L    Chloride 106 96 - 112 mmol/L    Co2 21 20 - 33 mmol/L    Anion Gap 11.0 7.0 - 16.0    Glucose 84 65 - 99 mg/dL    Bun 12 8 - 22 mg/dL    Creatinine 0.57 0.50 - 1.40 mg/dL    Calcium 8.8 8.4 - 10.2 mg/dL    AST(SGOT) 13 12 - 45 U/L    ALT(SGPT) 11 2 - 50 U/L    Alkaline Phosphatase 58 30 - 99 U/L    Total Bilirubin 0.9 0.1 - 1.5 mg/dL    Albumin 4.4 3.2 - 4.9 g/dL    Total Protein 7.1 6.0 - 8.2 g/dL    Globulin 2.7 1.9 - 3.5 g/dL    A-G Ratio 1.6 g/dL   LIPASE   Result Value Ref Range    Lipase 26 7 - 58 U/L   URINALYSIS,CULTURE IF INDICATED    Specimen: Blood   Result Value Ref Range    Color Yellow     Character Cloudy (A)     Specific Gravity >=1.030 <1.035    Ph 6.0 5.0 - 8.0    Glucose Negative Negative mg/dL    Ketones >=80 (A) Negative mg/dL    Protein Negative Negative mg/dL    Bilirubin Negative Negative    Nitrite Negative Negative    Leukocyte Esterase Trace (A) Negative    Occult Blood Negative Negative    Micro Urine Req Microscopic    HCG QUAL SERUM   Result Value Ref Range    Beta-Hcg Qualitative Serum Negative Negative   URINE MICROSCOPIC (W/UA)   Result Value Ref Range    WBC 10-20 (A) /hpf    RBC 0-2 /hpf    Bacteria Moderate (A) None /hpf    Epithelial Cells Many (A) Few /hpf    Mucous Threads Moderate /hpf   URINE CULTURE(NEW)    Specimen: Urine   Result Value Ref Range    Significant Indicator NEG     Source UR     Site -     Culture Result -    ESTIMATED GFR   Result Value Ref Range    GFR If African American >60 >60 mL/min/1.73 m 2    GFR If Non African American >60 >60 mL/min/1.73 m 2   SARS-COV Antigen PATEL: Collect dry nasal swab AND NP swab in VTM   Result Value Ref Range    SARS-CoV-2 Source Nasal Swab     SARS-COV ANTIGEN PATEL NotDetected Not-Detected   SARS-CoV-2 PCR (24 hour In-House): Collect NP swab in VTM    Specimen: Respirate   Result Value Ref Range    SARS-CoV-2 Source NP Swab          Medications given in the ER  Medications   lactated ringers infusion (has no  administration in time range)   ondansetron (ZOFRAN) syringe/vial injection 4 mg (4 mg Intravenous Given 6/9/21 2202)   morphine (pf) 4 mg/mL injection 4 mg (4 mg Intravenous Given 6/9/21 2204)   NS infusion 1,000 mL (0 mL Intravenous Stopped 6/9/21 2300)   cefTRIAXone (ROCEPHIN) 1 g in  mL IVPB (0 g Intravenous Stopped 6/9/21 2237)   metroNIDAZOLE (FLAGYL) IVPB 500 mg (0 mg Intravenous Stopped 6/10/21 0040)   iohexol (OMNIPAQUE) 350 mg/mL (70 mL Intravenous Given 6/9/21 2354)   morphine (pf) 4 mg/mL injection 2 mg (2 mg Intravenous Given 6/10/21 0034)       COURSE & MEDICAL DECISION MAKING  Pertinent Labs & Imaging studies reviewed. (See chart for details)  Differential includes acute appendicitis, ovarian torsion, urinary tract infection, gastroenteritis      HYDRATION: Based on the patient's presentation of Inability to take oral fluids the patient was given IV fluids. IV Hydration was used because oral hydration was not as rapid as required. Upon recheck following hydration, the patient was Patient felt slightly better after IV fluids..    2309 discussed the case with Dr. Hernandez general surgery.  He states that he does not believe the ultrasound is would like to get a CT scan to confirm..    0028 paged Dr. Hernandez.    00 30 discussed the case with Dr. Hernandez and CT results.  He will take the patient to the OR tonight.  He asked that patient be given a dose of antibiotics.      I verified that the patient was wearing a mask and I was wearing appropriate PPE every time I entered the room. The patient's mask was on the patient at all times during my encounter except for a brief view of the oropharynx.        Medical decision making: Patient presents with abdominal pain that is migrated to the right lower quadrant.  Patient had elevated white blood cell count and CT and ultrasound show what appears to be acute appendicitis.  Patient has been given Rocephin and Flagyl.  She will be taken to the OR for  surgery        DISPOSITION:  Patient will be hospitalized by Dr. Hernandez in guarded condition.      FINAL IMPRESSION  1. Acute appendicitis with localized peritonitis, without perforation, abscess, or gangrene      Electronically signed by: Danny Ruff M.D., 6/9/2021 9:24 PM  This record was made with a voice recognition software. The software is not perfect. I have tried to correct any grammar, spelling or context errors to the best of my ability, but errors may still remain. Interpretation of this chart should be taken in this context.

## 2021-06-10 NOTE — ANESTHESIA POSTPROCEDURE EVALUATION
Patient: Lissette Tong    Procedure Summary     Date: 06/10/21 Room / Location:  OR  / SURGERY [facility]    Anesthesia Start: 0143 Anesthesia Stop: 0225    Procedure: APPENDECTOMY, LAPAROSCOPIC (N/A Abdomen) Diagnosis: (acute appendicitis)    Surgeons: Oneil Hernandez M.D. Responsible Provider: Ilia Griggs M.D.    Anesthesia Type: general ASA Status: 1 - Emergent          Final Anesthesia Type: general  Last vitals  BP   Blood Pressure: 123/72    Temp   36.7 °C (98 °F)    Pulse   65   Resp   18    SpO2   99 %      Anesthesia Post Evaluation    Patient location during evaluation: PACU  Patient participation: complete - patient participated  Level of consciousness: awake and alert  Pain score: 2    Airway patency: patent  Anesthetic complications: no  Cardiovascular status: hemodynamically stable  Respiratory status: acceptable  Hydration status: euvolemic    PONV: none          No complications documented.     Nurse Pain Score: 2 (NPRS)

## 2021-06-10 NOTE — ANESTHESIA TIME REPORT
Anesthesia Start and Stop Event Times     Date Time Event    6/10/2021 0103 Ready for Procedure     0143 Anesthesia Start     0225 Anesthesia Stop        Responsible Staff  06/10/21    Name Role Begin End    Ilia Griggs M.D. Anesth 0143 0225        Preop Diagnosis (Free Text):  Pre-op Diagnosis     acute appendicitis        Preop Diagnosis (Codes):    Post op Diagnosis  Acute appendicitis      Premium Reason  B. 1st Call    Comments:

## 2021-06-12 LAB
BACTERIA UR CULT: NORMAL
SIGNIFICANT IND 70042: NORMAL
SITE SITE: NORMAL
SOURCE SOURCE: NORMAL

## 2024-02-20 NOTE — ANESTHESIA PREPROCEDURE EVALUATION
Asthma, otherwise healthy.    Physical Exam    Airway   Mallampati: II  TM distance: >3 FB  Neck ROM: full       Cardiovascular - normal exam  Rhythm: regular  Rate: normal  (-) murmur     Dental - normal exam           Pulmonary - normal exam  Breath sounds clear to auscultation     Abdominal    Neurological - normal exam                 Anesthesia Plan    ASA 1- EMERGENT   ASA physical status emergent criteria: acute deteriorating condition due to infection    Plan - general       Airway plan will be ETT          Induction: intravenous    Postoperative Plan: Postoperative administration of opioids is intended.    Pertinent diagnostic labs and testing reviewed    Informed Consent:    Anesthetic plan and risks discussed with patient.    Use of blood products discussed with: patient whom consented to blood products.         
yes

## 2024-05-01 ENCOUNTER — HOSPITAL ENCOUNTER (OUTPATIENT)
Facility: MEDICAL CENTER | Age: 29
End: 2024-05-01
Attending: NURSE PRACTITIONER
Payer: COMMERCIAL

## 2024-05-01 ENCOUNTER — APPOINTMENT (OUTPATIENT)
Dept: URGENT CARE | Facility: CLINIC | Age: 29
End: 2024-05-01

## 2024-05-01 ENCOUNTER — APPOINTMENT (OUTPATIENT)
Dept: RADIOLOGY | Facility: IMAGING CENTER | Age: 29
End: 2024-05-01
Attending: NURSE PRACTITIONER

## 2024-05-01 VITALS
DIASTOLIC BLOOD PRESSURE: 78 MMHG | HEART RATE: 81 BPM | RESPIRATION RATE: 16 BRPM | BODY MASS INDEX: 25.8 KG/M2 | TEMPERATURE: 98.8 F | OXYGEN SATURATION: 98 % | SYSTOLIC BLOOD PRESSURE: 110 MMHG | WEIGHT: 145.6 LBS | HEIGHT: 63 IN

## 2024-05-01 DIAGNOSIS — Z02.1 PRE-EMPLOYMENT DRUG SCREENING: ICD-10-CM

## 2024-05-01 DIAGNOSIS — Z02.1 PRE-EMPLOYMENT HEALTH SCREENING EXAMINATION: ICD-10-CM

## 2024-05-01 LAB
ALBUMIN SERPL BCP-MCNC: 4.5 G/DL (ref 3.2–4.9)
ALBUMIN/GLOB SERPL: 1.6 G/DL
ALP SERPL-CCNC: 55 U/L (ref 30–99)
ALT SERPL-CCNC: 17 U/L (ref 2–50)
AMP AMPHETAMINE: NORMAL
ANION GAP SERPL CALC-SCNC: 11 MMOL/L (ref 7–16)
AST SERPL-CCNC: 17 U/L (ref 12–45)
BAR BARBITURATES: NORMAL
BILIRUB SERPL-MCNC: 0.5 MG/DL (ref 0.1–1.5)
BREATH ALCOHOL COMMENT: NORMAL
BUN SERPL-MCNC: 16 MG/DL (ref 8–22)
BZO BENZODIAZEPINES: NORMAL
CALCIUM ALBUM COR SERPL-MCNC: 8.8 MG/DL (ref 8.5–10.5)
CALCIUM SERPL-MCNC: 9.2 MG/DL (ref 8.5–10.5)
CHLORIDE SERPL-SCNC: 103 MMOL/L (ref 96–112)
CHOLEST SERPL-MCNC: 201 MG/DL (ref 100–199)
CO2 SERPL-SCNC: 24 MMOL/L (ref 20–33)
COC COCAINE: NORMAL
CREAT SERPL-MCNC: 0.72 MG/DL (ref 0.5–1.4)
ERYTHROCYTE [DISTWIDTH] IN BLOOD BY AUTOMATED COUNT: 43.3 FL (ref 35.9–50)
GFR SERPLBLD CREATININE-BSD FMLA CKD-EPI: 117 ML/MIN/1.73 M 2
GLOBULIN SER CALC-MCNC: 2.8 G/DL (ref 1.9–3.5)
GLUCOSE SERPL-MCNC: 85 MG/DL (ref 65–99)
HCT VFR BLD AUTO: 45.9 % (ref 37–47)
HDLC SERPL-MCNC: 72 MG/DL
HGB BLD-MCNC: 15 G/DL (ref 12–16)
INT CON NEG: NEGATIVE
INT CON POS: POSITIVE
LDLC SERPL CALC-MCNC: 120 MG/DL
MCH RBC QN AUTO: 30.7 PG (ref 27–33)
MCHC RBC AUTO-ENTMCNC: 32.7 G/DL (ref 32.2–35.5)
MCV RBC AUTO: 94.1 FL (ref 81.4–97.8)
MDMA ECSTASY: NORMAL
MET METHAMPHETAMINES: NORMAL
MTD METHADONE: NORMAL
OPI OPIATES: NORMAL
OXY OXYCODONE: NORMAL
PCP PHENCYCLIDINE: NORMAL
PLATELET # BLD AUTO: 301 K/UL (ref 164–446)
PMV BLD AUTO: 10.5 FL (ref 9–12.9)
POC BREATHALIZER: 0 PERCENT (ref 0–0.01)
POC URINE DRUG SCREEN OCDRS: NEGATIVE
POTASSIUM SERPL-SCNC: 4.4 MMOL/L (ref 3.6–5.5)
PROT SERPL-MCNC: 7.3 G/DL (ref 6–8.2)
RBC # BLD AUTO: 4.88 M/UL (ref 4.2–5.4)
SODIUM SERPL-SCNC: 138 MMOL/L (ref 135–145)
THC: NORMAL
TRIGL SERPL-MCNC: 45 MG/DL (ref 0–149)
WBC # BLD AUTO: 7.7 K/UL (ref 4.8–10.8)

## 2024-05-01 PROCEDURE — 82075 ASSAY OF BREATH ETHANOL: CPT | Performed by: NURSE PRACTITIONER

## 2024-05-01 PROCEDURE — 92552 PURE TONE AUDIOMETRY AIR: CPT | Performed by: NURSE PRACTITIONER

## 2024-05-01 PROCEDURE — 99000 SPECIMEN HANDLING OFFICE-LAB: CPT | Performed by: NURSE PRACTITIONER

## 2024-05-01 PROCEDURE — 94010 BREATHING CAPACITY TEST: CPT | Performed by: NURSE PRACTITIONER

## 2024-05-01 PROCEDURE — 36415 COLL VENOUS BLD VENIPUNCTURE: CPT | Performed by: NURSE PRACTITIONER

## 2024-05-01 PROCEDURE — 80305 DRUG TEST PRSMV DIR OPT OBS: CPT | Performed by: NURSE PRACTITIONER

## 2024-05-01 PROCEDURE — 8915 PR COMPREHENSIVE PHYSICAL: Performed by: NURSE PRACTITIONER

## 2024-05-01 PROCEDURE — 3074F SYST BP LT 130 MM HG: CPT | Performed by: NURSE PRACTITIONER

## 2024-05-01 PROCEDURE — 71046 X-RAY EXAM CHEST 2 VIEWS: CPT | Mod: TC | Performed by: NURSE PRACTITIONER

## 2024-05-01 RX ORDER — ALBUTEROL SULFATE 90 UG/1
AEROSOL, METERED RESPIRATORY (INHALATION)
COMMUNITY
Start: 2015-11-02 | End: 2024-05-01

## 2024-05-01 RX ORDER — ALBUTEROL SULFATE 2.5 MG/3ML
SOLUTION RESPIRATORY (INHALATION)
COMMUNITY
Start: 2014-03-19 | End: 2024-05-01

## 2024-05-01 ASSESSMENT — ENCOUNTER SYMPTOMS
SPUTUM PRODUCTION: 0
COUGH: 0
WHEEZING: 0
HEMOPTYSIS: 0
FEVER: 0
CHILLS: 0
PALPITATIONS: 0
SHORTNESS OF BREATH: 0

## 2024-05-01 NOTE — PROGRESS NOTES
"Subjective     Lissette Tong is a 29 y.o. female who presents with Employment Physical ([facility], physical, audio, spirometry, labs an 2 v cxr , Lumbar spine xray) and Drug / Alcohol Assessment            Patient comes in today for a pre-employment physical. She reports she is in general good health and does not anticipate any difficulty performing her expected job duties.  No acute concerns.  See scanned forms.        Review of Systems   Constitutional:  Negative for chills, fever and malaise/fatigue.   Respiratory:  Negative for cough, hemoptysis, sputum production, shortness of breath and wheezing.    Cardiovascular:  Negative for chest pain and palpitations.     Medications, Allergies, and current problem list reviewed today in Epic         Objective     Blood Pressure 110/78   Pulse 81   Temperature 37.1 °C (98.8 °F) (Temporal)   Respiration 16   Height 1.6 m (5' 3\")   Weight 66 kg (145 lb 9.6 oz)   Oxygen Saturation 98%   Body Mass Index 25.79 kg/m²      Physical Exam  Vitals reviewed.   Constitutional:       General: She is not in acute distress.     Appearance: Normal appearance. She is well-developed. She is not ill-appearing, toxic-appearing or diaphoretic.   HENT:      Head: Normocephalic.      Right Ear: Tympanic membrane, ear canal and external ear normal. There is no impacted cerumen.      Left Ear: Tympanic membrane, ear canal and external ear normal. There is no impacted cerumen.      Nose: Nose normal.      Mouth/Throat:      Mouth: Mucous membranes are moist.      Pharynx: No oropharyngeal exudate or posterior oropharyngeal erythema.   Eyes:      General: No scleral icterus.        Right eye: No discharge.         Left eye: No discharge.      Conjunctiva/sclera: Conjunctivae normal.      Pupils: Pupils are equal, round, and reactive to light.   Neck:      Thyroid: No thyromegaly.      Vascular: No JVD.      Trachea: No tracheal deviation.   Cardiovascular:      Rate and " Rhythm: Normal rate and regular rhythm.      Heart sounds: Normal heart sounds. No murmur heard.     No friction rub. No gallop.   Pulmonary:      Effort: Pulmonary effort is normal. No respiratory distress.      Breath sounds: Normal breath sounds. No stridor. No wheezing, rhonchi or rales.   Chest:      Chest wall: No tenderness.   Abdominal:      General: There is no distension.      Palpations: Abdomen is soft.      Tenderness: There is no abdominal tenderness. There is no right CVA tenderness or left CVA tenderness.   Musculoskeletal:         General: No tenderness. Normal range of motion.      Cervical back: Normal range of motion and neck supple.   Lymphadenopathy:      Cervical: No cervical adenopathy.   Skin:     General: Skin is warm and dry.      Coloration: Skin is not jaundiced.   Neurological:      General: No focal deficit present.      Mental Status: She is alert and oriented to person, place, and time.      Motor: No weakness.      Coordination: Coordination normal.      Gait: Gait normal.   Psychiatric:         Mood and Affect: Mood normal.                             Assessment & Plan        1. Pre-employment health screening examination    - CBC WITHOUT DIFFERENTIAL; Future  - COMP METABOLIC PANEL; Future  - LIPID PROFILE; Future  - DX-CHEST-2 VIEWS; Future  - DX-LUMBAR SPINE-2 OR 3 VIEWS; Future    2. Pre-employment drug screening    - POCT Breath Alcohol Test  - POCT 11 Panel Urine Drug Screen     Discussed exam findings with Lissette. Cleared for work with no restrictions.

## 2025-10-21 ENCOUNTER — APPOINTMENT (OUTPATIENT)
Facility: MEDICAL CENTER | Age: 30
End: 2025-10-21
Attending: PSYCHIATRY & NEUROLOGY
Payer: COMMERCIAL

## (undated) DEVICE — LACTATED RINGERS INJ 1000 ML - (14EA/CA 60CA/PF)

## (undated) DEVICE — SUTURE 0 VICRYL PLUS UR-6 - 27 INCH (36/BX)

## (undated) DEVICE — CANNULA W/SEAL 5X100 Z-THRE - ADED KII (12/BX)

## (undated) DEVICE — NEPTUNE 4 PORT MANIFOLD - (20/PK)

## (undated) DEVICE — Device

## (undated) DEVICE — ELECTRODE DUAL RETURN W/ CORD - (50/PK)

## (undated) DEVICE — SUTURE GENERAL

## (undated) DEVICE — NEEDLE INSFL 120MM 14GA VRRS - (20/BX)

## (undated) DEVICE — SPONGE GAUZESTER. 2X2 4-PL - (2/PK 50PK/BX 30BX/CS)

## (undated) DEVICE — STAPLE 45MM BLUE 3.5MM ECHELON (12EA/BX)

## (undated) DEVICE — SENSOR SPO2 NEO LNCS ADHESIVE (20/BX) SEE USER NOTES

## (undated) DEVICE — GOWN WARMING STANDARD FLEX - (30/CA)

## (undated) DEVICE — HANDPIECE 10FT INTPLS SCT PLS IRRIGATION HAND CONTROL SET (6/PK)

## (undated) DEVICE — HEAD HOLDER JUNIOR/ADULT

## (undated) DEVICE — DRAPESURG STERI-DRAPE LONG - (10/BX 4BX/CA)

## (undated) DEVICE — SCISSORS HANDLE HARMONIC ACE - 45CM CURVED (6/BX)

## (undated) DEVICE — CANISTER SUCTION 3000ML MECHANICAL FILTER AUTO SHUTOFF MEDI-VAC NONSTERILE LF DISP  (40EA/CA)

## (undated) DEVICE — SODIUM CHL IRRIGATION 0.9% 1000ML (12EA/CA)

## (undated) DEVICE — BAG RETRIEVAL 10ML (10EA/BX)

## (undated) DEVICE — TROCAR 5X100 NON BLADED Z-TH - READ KII (6/BX)

## (undated) DEVICE — ELECTRODE 850 FOAM ADHESIVE - HYDROGEL RADIOTRNSPRNT (50/PK)

## (undated) DEVICE — SUTURE 4-0 VICRYL PLUS FS-2 - 27 INCH (36/BX)

## (undated) DEVICE — DERMABOND ADVANCED - (12EA/BX)

## (undated) DEVICE — SUCTION INSTRUMENT YANKAUER BULBOUS TIP W/O VENT (50EA/CA)

## (undated) DEVICE — MASK ANESTHESIA ADULT  - (100/CA)

## (undated) DEVICE — CHLORAPREP 26 ML APPLICATOR - ORANGE TINT(25/CA)

## (undated) DEVICE — TUBING CLEARLINK DUO-VENT - C-FLO (48EA/CA)

## (undated) DEVICE — GLOVE BIOGEL SZ 7.5 SURGICAL PF LTX - (50PR/BX 4BX/CA)

## (undated) DEVICE — STAPLE 45MM BLUE 4.5MM (12EA/BX)

## (undated) DEVICE — SLEEVE, VASO, THIGH, MED

## (undated) DEVICE — STAPLER 45MM ARTICULATING - (3EA/BX)

## (undated) DEVICE — PROTECTOR ULNA NERVE - (36PR/CA)

## (undated) DEVICE — STAPLER POWERED 45MM (3EA/BX)

## (undated) DEVICE — SET EXTENSION WITH 2 PORTS (48EA/CA) ***PART #2C8610 IS A SUBSTITUTE*****

## (undated) DEVICE — GLOVE BIOGEL INDICATOR SZ 7.5 SURGICAL PF LTX - (50PR/BX 4BX/CA)

## (undated) DEVICE — SUTURE 3-0 ETHILON FS-1 - (36/BX) 30 INCH

## (undated) DEVICE — SUTURE 0 COATED VICRYL 6-18IN - (12PK/BX)

## (undated) DEVICE — TOWEL STOP TIMEOUT SAFETY FLAG (40EA/CA)

## (undated) DEVICE — KIT ANESTHESIA W/CIRCUIT & 3/LT BAG W/FILTER (20EA/CA)

## (undated) DEVICE — WATER IRRIGATION STERILE 1000ML (12EA/CA)